# Patient Record
Sex: MALE | Race: BLACK OR AFRICAN AMERICAN | Employment: UNEMPLOYED | URBAN - METROPOLITAN AREA
[De-identification: names, ages, dates, MRNs, and addresses within clinical notes are randomized per-mention and may not be internally consistent; named-entity substitution may affect disease eponyms.]

---

## 2018-02-16 ENCOUNTER — HOSPITAL ENCOUNTER (EMERGENCY)
Age: 48
Discharge: HOME | End: 2018-02-17
Attending: EMERGENCY MEDICINE
Payer: SELF-PAY

## 2018-02-16 DIAGNOSIS — I10 ESSENTIAL HYPERTENSION: ICD-10-CM

## 2018-02-16 DIAGNOSIS — R07.89 CHEST WALL PAIN: ICD-10-CM

## 2018-02-16 DIAGNOSIS — R07.89 ATYPICAL CHEST PAIN: Primary | ICD-10-CM

## 2018-02-16 PROCEDURE — 96374 THER/PROPH/DIAG INJ IV PUSH: CPT

## 2018-02-16 PROCEDURE — 80053 COMPREHEN METABOLIC PANEL: CPT | Performed by: EMERGENCY MEDICINE

## 2018-02-16 PROCEDURE — 82550 ASSAY OF CK (CPK): CPT | Performed by: EMERGENCY MEDICINE

## 2018-02-16 PROCEDURE — 93005 ELECTROCARDIOGRAM TRACING: CPT

## 2018-02-16 PROCEDURE — 84484 ASSAY OF TROPONIN QUANT: CPT | Performed by: EMERGENCY MEDICINE

## 2018-02-16 PROCEDURE — 99284 EMERGENCY DEPT VISIT MOD MDM: CPT

## 2018-02-16 PROCEDURE — 36415 COLL VENOUS BLD VENIPUNCTURE: CPT | Performed by: EMERGENCY MEDICINE

## 2018-02-16 PROCEDURE — 85025 COMPLETE CBC W/AUTO DIFF WBC: CPT | Performed by: EMERGENCY MEDICINE

## 2018-02-16 PROCEDURE — 74011250636 HC RX REV CODE- 250/636: Performed by: EMERGENCY MEDICINE

## 2018-02-16 PROCEDURE — 94762 N-INVAS EAR/PLS OXIMTRY CONT: CPT

## 2018-02-16 RX ORDER — GUAIFENESIN 100 MG/5ML
324 LIQUID (ML) ORAL
Status: DISCONTINUED | OUTPATIENT
Start: 2018-02-16 | End: 2018-02-17

## 2018-02-16 RX ORDER — MORPHINE SULFATE 2 MG/ML
2 INJECTION, SOLUTION INTRAMUSCULAR; INTRAVENOUS
Status: DISCONTINUED | OUTPATIENT
Start: 2018-02-16 | End: 2018-02-17

## 2018-02-16 NOTE — Clinical Note
[facility] SYSTEMS Departments For adult and child immunizations, family planning, TB screening, STD testing and women's health services. [facility] CENTER: [place] [phone] [employer] [phone] [facility]   [phone] [facility] Ci ty   [phone] Martita Louis   [phone] [facility] HOSPITAL: [place] [phone] [place] [phone] Bessy Canseco [phone] [address]  For primary care services, woman and child wellness, and some clinic s providing specialty care. [facility] -- [facility] [facility]. [address] [place] [address] [phone] [facility] 200 [facility] [phone] 132[address] [phone] [facility] 5850 [facility]  [phone] 7705 [facility] [phone] [facility] 81 [facility] [phone] [facility] 1051 [facility], 9 [facility] Crossover Clinic: [facility] [address], ext [phone] [facility], [address] [phone] [place] [address] [phone] [address] 5850 [facility]  [phone] [employer]  [address], [phone] [facility]8 [employer] ([email or web address]) [phone] Sexual Health/Woman Wellness Clinics For STD/HIV testing and treatment, pregnancy testing and services, men's health, birth control services and hepatitis/HPV vaccine services. [facility] 40 [address] [phone] [facility] Derrick Melgoza [phone] [phone] [address], 5th floor [phone] [facility] Women [address] Ferrari [phone] [facility] [employer] [facility] [place] [employer] [address] [place] [employer] [phone] Specialty Service Clinics 112 [facility] [phone] [facility]   [phone] Su Steward   [phone] Women, Infant and Children's Services: [address] [phone] [address] [phone] [address] [place] [address] [facility] Psychiatry     [phone] [facility] r Mental Health Crisis   [phone] 560 [facility] [phone] [facility] [phone] Lucita Gallardo M.D. Tod Arnold M.D. Patti Lee M.D. Vickie Song M.D. MD Nelson Saab, FNP JLUIS Guzman, FNP JLUIS Saab MD Chere Sawyers, NP Ozzie Goff, [address] [phone] MD Jhony Paz MD, MD [facility] [phone] Raynelle Gowers, MD Hermann Master, MD Martell Brady MD Oley Dixon, MD        [phone] Neftali Morris MD               [phone] Cristhian Rios MD      [phone] [facility] [phone] MD Edgar Riggs Res, MD [place], MD 
 [facility] [phone]

## 2018-02-17 ENCOUNTER — APPOINTMENT (OUTPATIENT)
Dept: GENERAL RADIOLOGY | Age: 48
End: 2018-02-17
Attending: EMERGENCY MEDICINE
Payer: SELF-PAY

## 2018-02-17 VITALS
SYSTOLIC BLOOD PRESSURE: 156 MMHG | OXYGEN SATURATION: 100 % | DIASTOLIC BLOOD PRESSURE: 101 MMHG | TEMPERATURE: 97.6 F | RESPIRATION RATE: 13 BRPM | BODY MASS INDEX: 40.43 KG/M2 | HEIGHT: 74 IN | WEIGHT: 315 LBS | HEART RATE: 72 BPM

## 2018-02-17 LAB
ALBUMIN SERPL-MCNC: 3.7 G/DL (ref 3.5–5)
ALBUMIN/GLOB SERPL: 0.9 {RATIO} (ref 1.1–2.2)
ALP SERPL-CCNC: 89 U/L (ref 45–117)
ALT SERPL-CCNC: 24 U/L (ref 12–78)
ANION GAP SERPL CALC-SCNC: 9 MMOL/L (ref 5–15)
AST SERPL-CCNC: 15 U/L (ref 15–37)
BASOPHILS # BLD: 0 K/UL (ref 0–0.1)
BASOPHILS NFR BLD: 0 % (ref 0–1)
BILIRUB SERPL-MCNC: 0.2 MG/DL (ref 0.2–1)
BUN SERPL-MCNC: 14 MG/DL (ref 6–20)
BUN/CREAT SERPL: 10 (ref 12–20)
CALCIUM SERPL-MCNC: 9.1 MG/DL (ref 8.5–10.1)
CHLORIDE SERPL-SCNC: 103 MMOL/L (ref 97–108)
CK MB CFR SERPL CALC: NORMAL % (ref 0–2.5)
CK MB SERPL-MCNC: <1 NG/ML (ref 5–25)
CK SERPL-CCNC: 72 U/L (ref 39–308)
CO2 SERPL-SCNC: 28 MMOL/L (ref 21–32)
CREAT SERPL-MCNC: 1.35 MG/DL (ref 0.7–1.3)
DIFFERENTIAL METHOD BLD: ABNORMAL
EOSINOPHIL # BLD: 0.4 K/UL (ref 0–0.4)
EOSINOPHIL NFR BLD: 4 % (ref 0–7)
ERYTHROCYTE [DISTWIDTH] IN BLOOD BY AUTOMATED COUNT: 14.2 % (ref 11.5–14.5)
GLOBULIN SER CALC-MCNC: 4.1 G/DL (ref 2–4)
GLUCOSE SERPL-MCNC: 100 MG/DL (ref 65–100)
HCT VFR BLD AUTO: 44.5 % (ref 36.6–50.3)
HGB BLD-MCNC: 15.4 G/DL (ref 12.1–17)
IMM GRANULOCYTES # BLD: 0 K/UL (ref 0–0.04)
IMM GRANULOCYTES NFR BLD AUTO: 0 % (ref 0–0.5)
LYMPHOCYTES # BLD: 4.9 K/UL (ref 0.8–3.5)
LYMPHOCYTES NFR BLD: 51 % (ref 12–49)
MCH RBC QN AUTO: 29.4 PG (ref 26–34)
MCHC RBC AUTO-ENTMCNC: 34.6 G/DL (ref 30–36.5)
MCV RBC AUTO: 85.1 FL (ref 80–99)
MONOCYTES # BLD: 0.5 K/UL (ref 0–1)
MONOCYTES NFR BLD: 6 % (ref 5–13)
NEUTS SEG # BLD: 3.7 K/UL (ref 1.8–8)
NEUTS SEG NFR BLD: 39 % (ref 32–75)
NRBC # BLD: 0 K/UL (ref 0–0.01)
NRBC BLD-RTO: 0 PER 100 WBC
PLATELET # BLD AUTO: 256 K/UL (ref 150–400)
PMV BLD AUTO: 9.3 FL (ref 8.9–12.9)
POTASSIUM SERPL-SCNC: 3.6 MMOL/L (ref 3.5–5.1)
PROT SERPL-MCNC: 7.8 G/DL (ref 6.4–8.2)
RBC # BLD AUTO: 5.23 M/UL (ref 4.1–5.7)
SODIUM SERPL-SCNC: 140 MMOL/L (ref 136–145)
TROPONIN I SERPL-MCNC: <0.04 NG/ML
WBC # BLD AUTO: 9.5 K/UL (ref 4.1–11.1)

## 2018-02-17 PROCEDURE — 74011250637 HC RX REV CODE- 250/637: Performed by: EMERGENCY MEDICINE

## 2018-02-17 PROCEDURE — 74011000250 HC RX REV CODE- 250: Performed by: EMERGENCY MEDICINE

## 2018-02-17 PROCEDURE — 71045 X-RAY EXAM CHEST 1 VIEW: CPT

## 2018-02-17 PROCEDURE — 74011250636 HC RX REV CODE- 250/636: Performed by: EMERGENCY MEDICINE

## 2018-02-17 RX ORDER — HYDROCHLOROTHIAZIDE 12.5 MG/1
12.5 TABLET ORAL DAILY
COMMUNITY
End: 2020-11-13

## 2018-02-17 RX ORDER — NAPROXEN 500 MG/1
500 TABLET ORAL
Qty: 20 TAB | Refills: 0 | Status: SHIPPED | OUTPATIENT
Start: 2018-02-17 | End: 2021-02-15 | Stop reason: ALTCHOICE

## 2018-02-17 RX ORDER — MAGNESIUM CITRATE
296 SOLUTION, ORAL ORAL
COMMUNITY
End: 2020-11-12

## 2018-02-17 RX ORDER — HYDROGEN PEROXIDE 3 %
20 SOLUTION, NON-ORAL MISCELLANEOUS DAILY
Qty: 30 CAP | Refills: 0 | Status: SHIPPED | OUTPATIENT
Start: 2018-02-17 | End: 2018-03-19

## 2018-02-17 RX ORDER — KETOROLAC TROMETHAMINE 30 MG/ML
30 INJECTION, SOLUTION INTRAMUSCULAR; INTRAVENOUS
Status: COMPLETED | OUTPATIENT
Start: 2018-02-17 | End: 2018-02-17

## 2018-02-17 RX ADMIN — PHENOBARBITAL ELIXIR 50 ML: 16.2; .1037; .0065; .0194 ELIXIR ORAL at 00:05

## 2018-02-17 RX ADMIN — KETOROLAC TROMETHAMINE 30 MG: 30 INJECTION, SOLUTION INTRAMUSCULAR; INTRAVENOUS at 01:53

## 2018-02-17 NOTE — ED PROVIDER NOTES
EMERGENCY DEPARTMENT HISTORY AND PHYSICAL EXAM      Date: 2/16/2018  Patient Name: Rosario Mckeon    History of Presenting Illness     Chief Complaint   Patient presents with    Chest Pain       History Provided By: Patient    HPI: Rosario Mckeon, 52 y.o. male with PMHx significant for diabetes, presents ambulatory to the ED with cc of intermittent episodes of right sided chest pain radiating down his right arm since this morning. The pain has been constant for the past hour prior to arrival, and he describes it as 9/10 throbbing currently. He notes the pain is exacerbated by movement and deep inspiration. He does not have a cardiologist. He denies any nausea, vomiting, or diarrhea. PCP: Morenita Cavazos MD    There are no other complaints, changes, or physical findings at this time. 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 No pertinent surgical history. Family History:  History reviewed. No pertinent family history. Social History:  Social History   Substance Use Topics    Smoking status: Current Every Day Smoker     Packs/day: 0.25    Smokeless tobacco: Never Used    Alcohol use No       Allergies: Allergies   Allergen Reactions    Pcn [Penicillins] Swelling         Review of Systems   Review of Systems   Constitutional: Negative for chills and fever. HENT: Negative for congestion, rhinorrhea, sneezing and sore throat. Eyes: Negative for redness and visual disturbance. Respiratory: Negative for shortness of breath. Cardiovascular: Positive for chest pain. Negative for leg swelling. Gastrointestinal: Negative for abdominal pain, nausea and vomiting. Genitourinary: Negative for difficulty urinating and frequency. Musculoskeletal: Negative for back pain, myalgias and neck stiffness.    Skin: Negative for rash. Neurological: Negative for dizziness, syncope, weakness and headaches. Hematological: Negative for adenopathy. All other systems reviewed and are negative. Physical Exam   Physical Exam   Constitutional: He is oriented to person, place, and time. He appears well-developed and well-nourished. Overweight. Appears uncomfortable. HENT:   Head: Normocephalic and atraumatic. Mouth/Throat: Oropharynx is clear and moist and mucous membranes are normal.   Eyes: EOM are normal.   Neck: Normal range of motion and full passive range of motion without pain. Neck supple. Cardiovascular: Normal rate, regular rhythm, normal heart sounds, intact distal pulses and normal pulses. No murmur heard. Pulmonary/Chest: Effort normal and breath sounds normal. No respiratory distress. He exhibits tenderness (reproducible, right sided). Abdominal: Soft. Normal appearance and bowel sounds are normal. There is no tenderness. There is no rebound and no guarding. Neurological: He is alert and oriented to person, place, and time. He has normal strength. Skin: Skin is warm, dry and intact. No rash noted. No erythema. Psychiatric: He has a normal mood and affect. His speech is normal and behavior is normal. Judgment and thought content normal.   Nursing note and vitals reviewed. Diagnostic Study Results     Labs -     Recent Results (from the past 12 hour(s))   TROPONIN I    Collection Time: 02/16/18 11:54 PM   Result Value Ref Range    Troponin-I, Qt. <0.04 <0.05 ng/mL   CBC WITH AUTOMATED DIFF    Collection Time: 02/16/18 11:54 PM   Result Value Ref Range    WBC 9.5 4.1 - 11.1 K/uL    RBC 5.23 4. 10 - 5.70 M/uL    HGB 15.4 12.1 - 17.0 g/dL    HCT 44.5 36.6 - 50.3 %    MCV 85.1 80.0 - 99.0 FL    MCH 29.4 26.0 - 34.0 PG    MCHC 34.6 30.0 - 36.5 g/dL    RDW 14.2 11.5 - 14.5 %    PLATELET 628 249 - 130 K/uL    MPV 9.3 8.9 - 12.9 FL    NRBC 0.0 0  WBC    ABSOLUTE NRBC 0.00 0.00 - 0.01 K/uL NEUTROPHILS 39 32 - 75 %    LYMPHOCYTES 51 (H) 12 - 49 %    MONOCYTES 6 5 - 13 %    EOSINOPHILS 4 0 - 7 %    BASOPHILS 0 0 - 1 %    IMMATURE GRANULOCYTES 0 0.0 - 0.5 %    ABS. NEUTROPHILS 3.7 1.8 - 8.0 K/UL    ABS. LYMPHOCYTES 4.9 (H) 0.8 - 3.5 K/UL    ABS. MONOCYTES 0.5 0.0 - 1.0 K/UL    ABS. EOSINOPHILS 0.4 0.0 - 0.4 K/UL    ABS. BASOPHILS 0.0 0.0 - 0.1 K/UL    ABS. IMM. GRANS. 0.0 0.00 - 0.04 K/UL    DF AUTOMATED     CK W/ CKMB & INDEX    Collection Time: 02/16/18 11:54 PM   Result Value Ref Range    CK 72 39 - 308 U/L    CK - MB <1.0 <3.6 NG/ML    CK-MB Index Cannot be calculated 0.0 - 2.5     METABOLIC PANEL, COMPREHENSIVE    Collection Time: 02/16/18 11:54 PM   Result Value Ref Range    Sodium 140 136 - 145 mmol/L    Potassium 3.6 3.5 - 5.1 mmol/L    Chloride 103 97 - 108 mmol/L    CO2 28 21 - 32 mmol/L    Anion gap 9 5 - 15 mmol/L    Glucose 100 65 - 100 mg/dL    BUN 14 6 - 20 MG/DL    Creatinine 1.35 (H) 0.70 - 1.30 MG/DL    BUN/Creatinine ratio 10 (L) 12 - 20      GFR est AA >60 >60 ml/min/1.73m2    GFR est non-AA 57 (L) >60 ml/min/1.73m2    Calcium 9.1 8.5 - 10.1 MG/DL    Bilirubin, total 0.2 0.2 - 1.0 MG/DL    ALT (SGPT) 24 12 - 78 U/L    AST (SGOT) 15 15 - 37 U/L    Alk. phosphatase 89 45 - 117 U/L    Protein, total 7.8 6.4 - 8.2 g/dL    Albumin 3.7 3.5 - 5.0 g/dL    Globulin 4.1 (H) 2.0 - 4.0 g/dL    A-G Ratio 0.9 (L) 1.1 - 2.2         Radiologic Studies -   XR CHEST PORT   Final Result        CT Results  (Last 48 hours)    None        CXR Results  (Last 48 hours)               02/17/18 0031  XR CHEST PORT Final result    Impression:  IMPRESSION: No acute process. Narrative:  EXAM:  CR chest portable       INDICATION:  Intermittent right-sided chest pain since this morning. COMPARISON: None. TECHNIQUE: Portable AP upright chest view at 0025 hours       FINDINGS: Cardiac monitoring wires overlie the thorax. The cardiomediastinal   contours are within normal limits.  The lungs and pleural spaces are clear. There   is no pneumothorax. The bones and upper abdomen are normal for age. Medical Decision Making   I am the first provider for this patient. I reviewed the vital signs, available nursing notes, past medical history, past surgical history, family history and social history. Vital Signs-Reviewed the patient's vital signs. Patient Vitals for the past 12 hrs:   Temp Pulse Resp BP SpO2   02/17/18 0130 - 72 13 (!) 156/101 100 %   02/17/18 0100 - 77 21 (!) 172/109 99 %   02/17/18 0030 - 77 19 (!) 167/114 100 %   02/17/18 0023 - - - - 100 %   02/16/18 2345 97.6 °F (36.4 °C) 85 14 (!) 131/102 100 %       Pulse Oximetry Analysis - 100% on room air    Cardiac Monitor:   Rate: 80 bpm  Rhythm: Normal Sinus Rhythm      EKG interpretation: (Preliminary) 11:48 PM  Rhythm: normal sinus rhythm; and regular . Rate (approx.): 80; Axis: normal; NV interval: normal; QRS interval: normal ; ST/T wave: normal; Other findings: normal.  Written by Jb Burgess. Chi Ruggiero, ED Scribe, as dictated by Chris Massey MD.    Records Reviewed: Nursing Notes and Old Medical Records    Provider Notes (Medical Decision Making):   DDx: musculoskeletal chest pain, GERD, ACS, aortic aneurysm     ED Course:   Initial assessment performed. The patients presenting problems have been discussed, and they are in agreement with the care plan formulated and outlined with them. I have encouraged them to ask questions as they arise throughout their visit. Progress Note  1:38 AM    Chris Massey MD has re-evaluated pt, and pt states his pain is improved after GI cocktail. Disposition:  DISCHARGE NOTE  2:18 AM  The patient has been re-evaluated and is ready for discharge. Reviewed available results with patient. Counseled pt on diagnosis and care plan. Pt has expressed understanding, and all questions have been answered.  Pt agrees with plan and agrees to follow up as recommended, or return to the ED if their symptoms worsen. Discharge instructions have been provided and explained to the pt, along with reasons to return to the ED. PLAN:  1. Current Discharge Medication List      START taking these medications    Details   esomeprazole (NEXIUM) 20 mg capsule Take 1 Cap by mouth daily for 30 days. Qty: 30 Cap, Refills: 0      naproxen (NAPROSYN) 500 mg tablet Take 1 Tab by mouth every twelve (12) hours as needed for Pain. Qty: 20 Tab, Refills: 0           2. Follow-up Information     Follow up With Details Comments Contact Info    One of the PCP's from the clinic list or back home Schedule an appointment as soon as possible for a visit      [facility] EMERGENCY DEPT  As needed, If symptoms worsen [address]  [phone]        Return to ED if worse     Diagnosis     Clinical Impression:   1. Atypical chest pain    2. Chest wall pain    3. Essential hypertension        Attestations: This note is prepared by Hansel Ford, acting as Scribe for Miguelina Liang MD.    Miguelina Liang MD: The scribe's documentation has been prepared under my direction and personally reviewed by me in its entirety. I confirm that the note above accurately reflects all work, treatment, procedures, and medical decision making performed by me.

## 2018-02-17 NOTE — ED NOTES
This RN assumes role as preceptor to Chai Pan RN. This RN reviews all assessments, charting, medication administration, and skills performed by the preceptee.

## 2018-02-17 NOTE — DISCHARGE INSTRUCTIONS
Chest Pain: Care Instructions  Your Care Instructions    There are many things that can cause chest pain. Some are not serious and will get better on their own in a few days. But some kinds of chest pain need more testing and treatment. Your doctor may have recommended a follow-up visit in the next 8 to 12 hours. If you are not getting better, you may need more tests or treatment. Even though your doctor has released you, you still need to watch for any problems. The doctor carefully checked you, but sometimes problems can develop later. If you have new symptoms or if your symptoms do not get better, get medical care right away. If you have worse or different chest pain or pressure that lasts more than 5 minutes or you passed out (lost consciousness), call 911 or seek other emergency help right away. A medical visit is only one step in your treatment. Even if you feel better, you still need to do what your doctor recommends, such as going to all suggested follow-up appointments and taking medicines exactly as directed. This will help you recover and help prevent future problems. How can you care for yourself at home? · Rest until you feel better. · Take your medicine exactly as prescribed. Call your doctor if you think you are having a problem with your medicine. · Do not drive after taking a prescription pain medicine. When should you call for help? Call 911 if:  ? · You passed out (lost consciousness). ? · You have severe difficulty breathing. ? · You have symptoms of a heart attack. These may include:  ¨ Chest pain or pressure, or a strange feeling in your chest.  ¨ Sweating. ¨ Shortness of breath. ¨ Nausea or vomiting. ¨ Pain, pressure, or a strange feeling in your back, neck, jaw, or upper belly or in one or both shoulders or arms. ¨ Lightheadedness or sudden weakness. ¨ A fast or irregular heartbeat.   After you call 911, the  may tell you to chew 1 adult-strength or 2 to 4 low-dose aspirin. Wait for an ambulance. Do not try to drive yourself. ?Call your doctor today if:  ? · You have any trouble breathing. ? · Your chest pain gets worse. ? · You are dizzy or lightheaded, or you feel like you may faint. ? · You are not getting better as expected. ? · You are having new or different chest pain. Where can you learn more? Go to http://lucia-kortney.info/. Enter A120 in the search box to learn more about \"Chest Pain: Care Instructions. \"  Current as of: March 20, 2017  Content Version: 11.4  © 1051-0127 [employer]. Care instructions adapted under license by [employer] (which disclaims liability or warranty for this information). If you have questions about a medical condition or this instruction, always ask your healthcare professional. [address] any warranty or liability for your use of this information. High Blood Pressure: Care Instructions  Your Care Instructions    If your blood pressure is usually above 140/90, you have high blood pressure, or hypertension. That means the top number is 140 or higher or the bottom number is 90 or higher, or both. Despite what a lot of people think, high blood pressure usually doesn't cause headaches or make you feel dizzy or lightheaded. It usually has no symptoms. But it does increase your risk for heart attack, stroke, and kidney or eye damage. The higher your blood pressure, the more your risk increases. Your doctor will give you a goal for your blood pressure. Your goal will be based on your health and your age. An example of a goal is to keep your blood pressure below 140/90. Lifestyle changes, such as eating healthy and being active, are always important to help lower blood pressure. You might also take medicine to reach your blood pressure goal.  Follow-up care is a key part of your treatment and safety.  Be sure to make and go to all appointments, and call your doctor if you are having problems. It's also a good idea to know your test results and keep a list of the medicines you take. How can you care for yourself at home? Medical treatment  · If you stop taking your medicine, your blood pressure will go back up. You may take one or more types of medicine to lower your blood pressure. Be safe with medicines. Take your medicine exactly as prescribed. Call your doctor if you think you are having a problem with your medicine. · Talk to your doctor before you start taking aspirin every day. Aspirin can help certain people lower their risk of a heart attack or stroke. But taking aspirin isn't right for everyone, because it can cause serious bleeding. · See your doctor regularly. You may need to see the doctor more often at first or until your blood pressure comes down. · If you are taking blood pressure medicine, talk to your doctor before you take decongestants or anti-inflammatory medicine, such as ibuprofen. Some of these medicines can raise blood pressure. · Learn how to check your blood pressure at home. Lifestyle changes  · Stay at a healthy weight. This is especially important if you put on weight around the waist. Losing even 10 pounds can help you lower your blood pressure. · If your doctor recommends it, get more exercise. Walking is a good choice. Bit by bit, increase the amount you walk every day. Try for at least 30 minutes on most days of the week. You also may want to swim, bike, or do other activities. · Avoid or limit alcohol. Talk to your doctor about whether you can drink any alcohol. · Try to limit how much sodium you eat to less than 2,300 milligrams (mg) a day. Your doctor may ask you to try to eat less than 1,500 mg a day. · Eat plenty of fruits (such as bananas and oranges), vegetables, legumes, whole grains, and low-fat dairy products. · Lower the amount of saturated fat in your diet.  Saturated fat is found in animal products such as milk, cheese, and meat. Limiting these foods may help you lose weight and also lower your risk for heart disease. · Do not smoke. Smoking increases your risk for heart attack and stroke. If you need help quitting, talk to your doctor about stop-smoking programs and medicines. These can increase your chances of quitting for good. When should you call for help? Call 911 anytime you think you may need emergency care. This may mean having symptoms that suggest that your blood pressure is causing a serious heart or blood vessel problem. Your blood pressure may be over 180/110. ? For example, call 911 if:  ? · You have symptoms of a heart attack. These may include:  ¨ Chest pain or pressure, or a strange feeling in the chest.  ¨ Sweating. ¨ Shortness of breath. ¨ Nausea or vomiting. ¨ Pain, pressure, or a strange feeling in the back, neck, jaw, or upper belly or in one or both shoulders or arms. ¨ Lightheadedness or sudden weakness. ¨ A fast or irregular heartbeat. ? · You have symptoms of a stroke. These may include:  ¨ Sudden numbness, tingling, weakness, or loss of movement in your face, arm, or leg, especially on only one side of your body. ¨ Sudden vision changes. ¨ Sudden trouble speaking. ¨ Sudden confusion or trouble understanding simple statements. ¨ Sudden problems with walking or balance. ¨ A sudden, severe headache that is different from past headaches. ? · You have severe back or belly pain. ?Do not wait until your blood pressure comes down on its own. Get help right away. ?Call your doctor now or seek immediate care if:  ? · Your blood pressure is much higher than normal (such as 180/110 or higher), but you don't have symptoms. ? · You think high blood pressure is causing symptoms, such as:  ¨ Severe headache. ¨ Blurry vision. ? Watch closely for changes in your health, and be sure to contact your doctor if:  ? · Your blood pressure measures 140/90 or higher at least 2 times.  That means the top number is 140 or higher or the bottom number is 90 or higher, or both. ? · You think you may be having side effects from your blood pressure medicine. ? · Your blood pressure is usually normal, but it goes above normal at least 2 times. Where can you learn more? Go to http://lucia-kortney.info/. Enter D635 in the search box to learn more about \"High Blood Pressure: Care Instructions. \"  Current as of: September 21, 2016  Content Version: 11.4  © 5926-8894 [employer]. Care instructions adapted under license by [employer] (which disclaims liability or warranty for this information). If you have questions about a medical condition or this instruction, always ask your healthcare professional. [address] any warranty or liability for your use of this information. Musculoskeletal Chest Pain: Care Instructions  Your Care Instructions    Chest pain is not always a sign that something is wrong with your heart or that you have another serious problem. The doctor thinks your chest pain is caused by strained muscles or ligaments, inflamed chest cartilage, or another problem in your chest, rather than by your heart. You may need more tests to find the cause of your chest pain. Follow-up care is a key part of your treatment and safety. Be sure to make and go to all appointments, and call your doctor if you are having problems. It's also a good idea to know your test results and keep a list of the medicines you take. How can you care for yourself at home? · Take pain medicines exactly as directed. ¨ If the doctor gave you a prescription medicine for pain, take it as prescribed. ¨ If you are not taking a prescription pain medicine, ask your doctor if you can take an over-the-counter medicine. · Rest and protect the sore area. · Stop, change, or take a break from any activity that may be causing your pain or soreness.   · Put ice or a cold pack on the sore area for 10 to 20 minutes at a time. Try to do this every 1 to 2 hours for the next 3 days (when you are awake) or until the swelling goes down. Put a thin cloth between the ice and your skin. · After 2 or 3 days, apply a heating pad set on low or a warm cloth to the area that hurts. Some doctors suggest that you go back and forth between hot and cold. · Do not wrap or tape your ribs for support. This may cause you to take smaller breaths, which could increase your risk of lung problems. · Mentholated creams such as Bengay or Icy Hot may soothe sore muscles. Follow the instructions on the package. · Follow your doctor's instructions for exercising. · Gentle stretching and massage may help you get better faster. Stretch slowly to the point just before pain begins, and hold the stretch for at least 15 to 30 seconds. Do this 3 or 4 times a day. Stretch just after you have applied heat. · As your pain gets better, slowly return to your normal activities. Any increased pain may be a sign that you need to rest a while longer. When should you call for help? Call 911 anytime you think you may need emergency care. For example, call if:  ? · You have chest pain or pressure. This may occur with:  ¨ Sweating. ¨ Shortness of breath. ¨ Nausea or vomiting. ¨ Pain that spreads from the chest to the neck, jaw, or one or both shoulders or arms. ¨ Dizziness or lightheadedness. ¨ A fast or uneven pulse. After calling 911, chew 1 adult-strength aspirin. Wait for an ambulance. Do not try to drive yourself. ? · You have sudden chest pain and shortness of breath, or you cough up blood. ?Call your doctor now or seek immediate medical care if:  ? · You have any trouble breathing. ? · Your chest pain gets worse. ? · Your chest pain occurs consistently with exercise and is relieved by rest.   ? Watch closely for changes in your health, and be sure to contact your doctor if:  ? · Your chest pain does not get better after 1 week. Where can you learn more? Go to http://lucia-kortney.info/. Enter V293 in the search box to learn more about \"Musculoskeletal Chest Pain: Care Instructions. \"  Current as of: March 20, 2017  Content Version: 11.4  © 7249-7020 [employer]. Care instructions adapted under license by [employer] (which disclaims liability or warranty for this information). If you have questions about a medical condition or this instruction, always ask your healthcare professional. [address] any warranty or liability for your use of this information.

## 2018-02-17 NOTE — ED NOTES
Discharge instructions were given to the patient by Garret Antonio provider. The patient left the Emergency Department ambulatory, alert and oriented and in no acute distress with 2 prescriptions. The patient was encouraged to call or return to the ED for worsening issues or problems and was encouraged to schedule a follow up appointment for continuing care. The patient verbalized understanding of discharge instructions and prescriptions, all questions were answered. The patient has no further concerns at this time.

## 2018-02-17 NOTE — ED NOTES
Pt reporting previous fall at unknown time either two weeks ago or two months ago. Pt reports fracture to right wrist stating \"I broke it in six places\". Pt is also reporting fracture to right 3rd digit. Family member at bedside reports pt had bruise along right shoulder. Pt and family member given water to drink. Pt reporting improvement in pain after GI cocktail. MD made aware.

## 2018-02-17 NOTE — ED NOTES
Pt presents to ED via wheelchair complaining of pain to the right side of the chest that shoots down the right arm where pt also reporting numbness and tingling. Pt reports symptoms started this AM but went away and started again approx one hour PTA. Pt is alert and oriented x 4, skin is warm and dry. Assessment completed and pt updated on plan of care. Emergency Department Nursing Plan of Care       The Nursing Plan of Care is developed from the Nursing assessment and Emergency Department Attending provider initial evaluation. The plan of care may be reviewed in the ED Provider note.     The Plan of Care was developed with the following considerations:   Patient / Family readiness to learn indicated by:verbalized understanding  Persons(s) to be included in education: patient and family  Barriers to Learning/Limitations:No    Signed     Harpreet Aquilino    2/17/2018   12:29 AM

## 2018-02-20 LAB
ATRIAL RATE: 80 BPM
CALCULATED P AXIS, ECG09: 32 DEGREES
CALCULATED R AXIS, ECG10: 4 DEGREES
CALCULATED T AXIS, ECG11: 35 DEGREES
DIAGNOSIS, 93000: NORMAL
P-R INTERVAL, ECG05: 192 MS
Q-T INTERVAL, ECG07: 376 MS
QRS DURATION, ECG06: 80 MS
QTC CALCULATION (BEZET), ECG08: 433 MS
VENTRICULAR RATE, ECG03: 80 BPM

## 2019-03-02 ENCOUNTER — HOSPITAL ENCOUNTER (EMERGENCY)
Age: 49
Discharge: HOME | End: 2019-03-03
Attending: EMERGENCY MEDICINE
Payer: COMMERCIAL

## 2019-03-02 VITALS
HEIGHT: 75 IN | RESPIRATION RATE: 20 BRPM | HEART RATE: 82 BPM | SYSTOLIC BLOOD PRESSURE: 174 MMHG | WEIGHT: 315 LBS | DIASTOLIC BLOOD PRESSURE: 104 MMHG | TEMPERATURE: 97.9 F | BODY MASS INDEX: 39.17 KG/M2 | OXYGEN SATURATION: 99 %

## 2019-03-02 DIAGNOSIS — R19.7 DIARRHEA, UNSPECIFIED TYPE: Primary | ICD-10-CM

## 2019-03-02 DIAGNOSIS — R11.0 NAUSEA WITHOUT VOMITING: ICD-10-CM

## 2019-03-02 DIAGNOSIS — I10 HYPERTENSION, UNSPECIFIED TYPE: ICD-10-CM

## 2019-03-02 PROCEDURE — 99283 EMERGENCY DEPT VISIT LOW MDM: CPT

## 2019-03-03 LAB
ANION GAP BLD CALC-SCNC: 17 MMOL/L (ref 10–20)
BUN BLD-MCNC: 16 MG/DL (ref 9–20)
CA-I BLD-MCNC: 1.11 MMOL/L (ref 1.12–1.32)
CHLORIDE BLD-SCNC: 102 MMOL/L (ref 98–107)
CO2 BLD-SCNC: 26 MMOL/L (ref 21–32)
CREAT BLD-MCNC: 1.1 MG/DL (ref 0.6–1.3)
GLUCOSE BLD-MCNC: 85 MG/DL (ref 65–100)
HCT VFR BLD CALC: 47 % (ref 36.6–50.3)
POTASSIUM BLD-SCNC: 4.2 MMOL/L (ref 3.5–5.1)
SERVICE CMNT-IMP: ABNORMAL
SODIUM BLD-SCNC: 139 MMOL/L (ref 136–145)

## 2019-03-03 PROCEDURE — 74011250637 HC RX REV CODE- 250/637: Performed by: EMERGENCY MEDICINE

## 2019-03-03 PROCEDURE — 80047 BASIC METABLC PNL IONIZED CA: CPT

## 2019-03-03 RX ORDER — HYDROCHLOROTHIAZIDE 25 MG/1
25 TABLET ORAL
Status: COMPLETED | OUTPATIENT
Start: 2019-03-03 | End: 2019-03-03

## 2019-03-03 RX ORDER — ONDANSETRON 4 MG/1
4 TABLET, ORALLY DISINTEGRATING ORAL
Qty: 10 TAB | Refills: 0 | Status: SHIPPED | OUTPATIENT
Start: 2019-03-03 | End: 2020-11-12

## 2019-03-03 RX ORDER — LOPERAMIDE HYDROCHLORIDE 2 MG/1
2 CAPSULE ORAL
Qty: 20 CAP | Refills: 0 | Status: SHIPPED | OUTPATIENT
Start: 2019-03-03 | End: 2020-11-12

## 2019-03-03 RX ADMIN — HYDROCHLOROTHIAZIDE 25 MG: 25 TABLET ORAL at 00:17

## 2019-03-03 NOTE — ED NOTES
Pt resting contently in room, in no acute distress, and updated on plan of care. Call bell within reach.

## 2019-03-03 NOTE — DISCHARGE INSTRUCTIONS

## 2019-03-03 NOTE — ED NOTES
Pt presents to ED ambulatory complaining of nausea and generalized body aches x 5 days. Pt reports diarrhea and limb \"cramping. \" Pt denies vomiting, cough, and states \"I had a fever but its gone now. \" Pt is alert and oriented x 4, RR even and unlabored, skin is warm and dry. Assessment completed and pt updated on plan of care. Emergency Department Nursing Plan of Care       The Nursing Plan of Care is developed from the Nursing assessment and Emergency Department Attending provider initial evaluation. The plan of care may be reviewed in the ED Provider note.     The Plan of Care was developed with the following considerations:   Patient / Family readiness to learn indicated by:verbalized understanding  Persons(s) to be included in education: patient  Barriers to Learning/Limitations:No    Signed     Chinyere Vieira    3/2/2019   10:49 PM

## 2019-03-03 NOTE — ED NOTES
Discharge instructions were given to the patient by Oanh Dave RN. The patient left the Emergency Department ambulatory, alert and oriented and in no acute distress with 2 prescriptions. The patient was encouraged to call or return to the ED for worsening issues or problems and was encouraged to schedule a follow up appointment for continuing care. The patient verbalized understanding of discharge instructions and prescriptions, all questions were answered. The patient has no further concerns at this time.

## 2019-03-03 NOTE — ED PROVIDER NOTES
EMERGENCY DEPARTMENT HISTORY AND PHYSICAL EXAM      Date: 3/2/2019  Patient Name: Bard Braga.    History of Presenting Illness     Chief Complaint   Patient presents with    Generalized Body Aches    Nausea       History Provided By: Patient    HPI: Neri Antonio Sr., 50 y.o. male with PMHx significant for HTN, GI, presents ambulatory to the ED with cc of moderate, progressive generalized body aches with associated diaphoresis, nausea, and diarrhea for 5 days. Pt reports he recently travelled to [place] and upon his return, symptoms gradually presented. He denies taking any medication to relieve current symptoms. He mentions noncompliance of medication regimen to manage HTN, lastly taking blood pressure medication ~ 2 days ago. Pt denies any modifying and exacerbating factors. He specifically denies any fevers, chills, vomiting, chest pain, shortness of breath, headache, rash, or weight loss. There are no other complaints, changes, or physical findings at this time. PCP: Dirk, MD Morenita    No current facility-administered medications on file prior to encounter. Current Outpatient Medications on File Prior to Encounter   Medication Sig Dispense Refill    hydroCHLOROthiazide (HYDRODIURIL) 12.5 mg tablet Take 12.5 mg by mouth daily.  magnesium citrate solution Take 296 mL by mouth now. Indications: pt takes every 2 weeks      naproxen (NAPROSYN) 500 mg tablet Take 1 Tab by mouth every twelve (12) hours as needed for Pain. 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 No pertinent surgical history. Family History:  History reviewed. No pertinent family history.     Social History:  Social History     Tobacco Use    Smoking status: Current Every Day Smoker     Packs/day: 0.25    Smokeless tobacco: Never Used   Substance Use Topics    Alcohol use: No    Drug use: No       Allergies: Allergies   Allergen Reactions    Pcn [Penicillins] Swelling         Review of Systems   Review of Systems   Constitutional: Positive for diaphoresis. Negative for chills and fever. HENT: Negative for congestion, rhinorrhea, sneezing and sore throat. Eyes: Negative for redness and visual disturbance. Respiratory: Negative for shortness of breath. Cardiovascular: Negative for chest pain and leg swelling. Gastrointestinal: Positive for diarrhea and nausea. Negative for abdominal pain and vomiting. Genitourinary: Negative for difficulty urinating and frequency. Musculoskeletal: Positive for myalgias. Negative for back pain and neck stiffness. Skin: Negative for rash. Neurological: Negative for dizziness, syncope, weakness and headaches. Hematological: Negative for adenopathy. All other systems reviewed and are negative. Physical Exam   Physical Exam     GENERAL: alert and oriented, no acute distress  EYES: PEERL, No injection, discharge or icterus. HENT: Mucous membranes pink and moist.  NECK: Supple  LUNGS: Airway patent. Non-labored respirations. Breath sounds clear with good air entry bilaterally. HEART: Regular rate and rhythm. No peripheral edema  ABDOMEN: Non-distended and non-tender, without guarding or rebound.   SKIN:  warm, dry  MSK/ EXTREMITIES: Without swelling, tenderness or deformity, symmetric with normal ROM  NEUROLOGICAL: Alert, oriented    Diagnostic Study Results     Labs -     Recent Results (from the past 12 hour(s))   POC CHEM8    Collection Time: 03/03/19 12:05 AM   Result Value Ref Range    Calcium, ionized (POC) 1.11 (L) 1.12 - 1.32 mmol/L    Sodium (POC) 139 136 - 145 mmol/L    Potassium (POC) 4.2 3.5 - 5.1 mmol/L    Chloride (POC) 102 98 - 107 mmol/L    CO2 (POC) 26 21 - 32 mmol/L    Anion gap (POC) 17 10 - 20 mmol/L    Glucose (POC) 85 65 - 100 mg/dL    BUN (POC) 16 9 - 20 mg/dL    Creatinine (POC) 1.1 0.6 - 1.3 mg/dL    GFRAA, POC >60 >60 ml/min/1.73m2    GFRNA, POC >60 >60 ml/min/1.73m2    Hematocrit (POC) 47 36.6 - 50.3 %    Comment Notified RN or MD immediately by          Radiologic Studies -   None    Medical Decision Making   I am the first provider for this patient. I reviewed the vital signs, available nursing notes, past medical history, past surgical history, family history and social history. Vital Signs-Reviewed the patient's vital signs. Patient Vitals for the past 12 hrs:   Temp Pulse Resp BP SpO2   03/02/19 2238 97.9 °F (36.6 °C) 82 20 (!) 174/104 99 %       Pulse Oximetry Analysis - 99% on RA    Records Reviewed: Nursing Notes, Old Medical Records, Previous Radiology Studies and Previous Laboratory Studies    Provider Notes (Medical Decision Making):   Patient presents to the emergency room for abd pain, vomiting and diarrhea. On presentation, the patient is well appearing, in no acute distress with vital signs notable for htn. Differential diagnosis considered includes gastroenteritis, gastritis, enteritis, inflammatory bowel disease, bowel obstruction, pancreatitis, cholecystitis, appendicitis, c.diff colitis, travelers diarrhea, diverticulitis, cyclic vomiting syndrome, or a food-borne illness. No sx suggestive of hypertensive emergency. Abdominal examination was soft and benign so no concern for any acute surgical issues at presentation. Clinical history, examination, and work-up are consistent with gastroenteritis and is expected to be self-limiting. Patient was provided  anti-emetics with overall improvement in symptoms. The patient was able to tolerate oral intake prior to discharge. Stools were reportedly non-bloody and no antibiotics were felt to be warranted at this time. Patient is encouraged to maintain hydration. A prescription for anti-emetics was provided. CT scan was not felt to be warranted at this time. Patient should return for severe pain, intractable vomiting, fevers, bloody stools, or dehydration. The patient understood the diagnosis and treatment and had no further questions. Patient should call a primary care physician for follow-up, and was otherwise deemed stable for discharge to home. ED Course:   Initial assessment performed. The patients presenting problems have been discussed, and they are in agreement with the care plan formulated and outlined with them. I have encouraged them to ask questions as they arise throughout their visit. HYPERTENSION COUNSELING:   Patient denies any current chest pain, headache, shortness of breath, lightheadedness, dizziness, or changes in vision. Patient is made aware of their elevated blood pressure and is instructed to follow up this week with their Primary Care for a recheck. Patient is counseled regarding consequences of chronic, uncontrolled hypertension including kidney disease, heart disease, stroke or even death. Patient verbally states their understanding. Tobacco Cessation Counseling   I spent 3 minutes discussing the medical risks of prolonged smoking habits and advised the patient of the benefits of the cessation of smoking, providing specific suggestions on how to quit. Tyrese Grigsby MD   .    Critical Care Time:   0 minutes    Disposition:  Discharge Note:  12:25 AM  The pt is ready for discharge. The pt's signs, symptoms, diagnosis, and discharge instructions have been discussed and pt has conveyed their understanding. The pt is to follow up as recommended or return to ER should their symptoms worsen. Plan has been discussed and pt is in agreement. PLAN:  1. Current Discharge Medication List      START taking these medications    Details   loperamide (IMODIUM) 2 mg capsule Take 1 Cap by mouth four (4) times daily as needed for Diarrhea.   Qty: 20 Cap, Refills: 0      ondansetron (ZOFRAN ODT) 4 mg disintegrating tablet Take 1 Tab by mouth every eight (8) hours as needed for Nausea. Qty: 10 Tab, Refills: 0           2. Follow-up Information     Follow up With Specialties Details Why [address] [place] [address] Internal Medicine Schedule an appointment as soon as possible for a visit in 2 days  [phone]  [phone]        Return to ED if worse     Diagnosis     Clinical Impression:   1. Diarrhea, unspecified type    2. Nausea without vomiting    3. Hypertension, unspecified type        Attestations: This note is prepared by Esme Silva, acting as Scribe for Julianna Taylor MD.    Julianna Taylor MD: The scribe's documentation has been prepared under my direction and personally reviewed by me in its entirety.  I confirm that the note above accurately reflects all work, treatment, procedures, and medical decision making performed by me

## 2019-12-31 ENCOUNTER — HOSPITAL ENCOUNTER (OUTPATIENT)
Dept: ULTRASOUND IMAGING | Age: 49
Discharge: HOME | End: 2019-12-31
Attending: NURSE PRACTITIONER
Payer: COMMERCIAL

## 2019-12-31 DIAGNOSIS — K46.9 HERNIA, ABDOMINAL: ICD-10-CM

## 2019-12-31 PROCEDURE — 76705 ECHO EXAM OF ABDOMEN: CPT

## 2020-11-12 ENCOUNTER — HOSPITAL ENCOUNTER (OUTPATIENT)
Age: 50
Setting detail: OBSERVATION
Discharge: HOME | End: 2020-11-13
Attending: EMERGENCY MEDICINE | Admitting: INTERNAL MEDICINE

## 2020-11-12 ENCOUNTER — APPOINTMENT (OUTPATIENT)
Dept: GENERAL RADIOLOGY | Age: 50
End: 2020-11-12
Attending: EMERGENCY MEDICINE

## 2020-11-12 DIAGNOSIS — M54.2 NECK PAIN: ICD-10-CM

## 2020-11-12 DIAGNOSIS — R06.09 DYSPNEA ON EXERTION: Primary | ICD-10-CM

## 2020-11-12 PROBLEM — R06.00 DYSPNEA: Status: ACTIVE | Noted: 2020-11-12

## 2020-11-12 PROBLEM — Z72.0 TOBACCO ABUSE: Status: ACTIVE | Noted: 2020-11-12

## 2020-11-12 PROBLEM — I51.7 CARDIOMEGALY: Status: ACTIVE | Noted: 2020-11-12

## 2020-11-12 PROBLEM — I10 HYPERTENSION: Status: ACTIVE | Noted: 2020-11-12

## 2020-11-12 PROBLEM — N17.9 AKI (ACUTE KIDNEY INJURY) (HCC): Status: ACTIVE | Noted: 2020-11-12

## 2020-11-12 PROBLEM — J45.909 ASTHMA: Status: ACTIVE | Noted: 2020-11-12

## 2020-11-12 LAB
ALBUMIN SERPL-MCNC: 3.8 G/DL (ref 3.5–5)
ALBUMIN/GLOB SERPL: 0.9 {RATIO} (ref 1.1–2.2)
ALP SERPL-CCNC: 75 U/L (ref 45–117)
ALT SERPL-CCNC: 20 U/L (ref 12–78)
ANION GAP SERPL CALC-SCNC: 7 MMOL/L (ref 5–15)
APPEARANCE UR: CLEAR
AST SERPL W P-5'-P-CCNC: 16 U/L (ref 15–37)
ATRIAL RATE: 85 BPM
BACTERIA URNS QL MICRO: ABNORMAL /HPF
BASOPHILS # BLD: 0.1 K/UL (ref 0–0.2)
BASOPHILS NFR BLD: 1 % (ref 0–2.5)
BILIRUB SERPL-MCNC: 0.8 MG/DL (ref 0.2–1)
BILIRUB UR QL: NEGATIVE
BNP SERPL-MCNC: 78 PG/ML
BUN SERPL-MCNC: 11 MG/DL (ref 6–20)
BUN/CREAT SERPL: 8 (ref 12–20)
CA-I BLD-MCNC: 9.1 MG/DL (ref 8.5–10.1)
CALCULATED P AXIS, ECG09: 35 DEGREES
CALCULATED R AXIS, ECG10: 30 DEGREES
CALCULATED T AXIS, ECG11: 55 DEGREES
CHLORIDE SERPL-SCNC: 100 MMOL/L (ref 97–108)
CO2 SERPL-SCNC: 28 MMOL/L (ref 21–32)
COLOR UR: ABNORMAL
CREAT SERPL-MCNC: 1.32 MG/DL (ref 0.7–1.3)
DEPRECATED S PYO AG THROAT QL EIA: NEGATIVE
DIAGNOSIS, 93000: NORMAL
EOSINOPHIL # BLD: 0.2 K/UL (ref 0–0.7)
EOSINOPHIL NFR BLD: 2 % (ref 0.9–2.9)
ERYTHROCYTE [DISTWIDTH] IN BLOOD BY AUTOMATED COUNT: 14.5 % (ref 11.5–14.5)
FLUAV AG NPH QL IA: NEGATIVE
FLUBV AG NOSE QL IA: NEGATIVE
GLOBULIN SER CALC-MCNC: 4.2 G/DL (ref 2–4)
GLUCOSE SERPL-MCNC: 92 MG/DL (ref 65–100)
GLUCOSE UR STRIP.AUTO-MCNC: NEGATIVE MG/DL
HCT VFR BLD AUTO: 46.6 % (ref 41–53)
HGB BLD-MCNC: 15.8 G/DL (ref 13.5–17.5)
HGB UR QL STRIP: ABNORMAL
INR PPP: 1 (ref 0.9–1.1)
KETONES UR QL STRIP.AUTO: NEGATIVE MG/DL
LEUKOCYTE ESTERASE UR QL STRIP.AUTO: NEGATIVE
LYMPHOCYTES # BLD: 2 K/UL (ref 1–4.8)
LYMPHOCYTES NFR BLD: 21 % (ref 20.5–51.1)
MAGNESIUM SERPL-MCNC: 1.8 MG/DL (ref 1.6–2.4)
MCH RBC QN AUTO: 29.9 PG (ref 31–34)
MCHC RBC AUTO-ENTMCNC: 34 G/DL (ref 31–36)
MCV RBC AUTO: 87.9 FL (ref 80–100)
MONOCYTES # BLD: 0.6 K/UL (ref 0.2–2.4)
MONOCYTES NFR BLD: 6 % (ref 1.7–9.3)
NEUTS SEG # BLD: 6.5 K/UL (ref 1.8–7.7)
NEUTS SEG NFR BLD: 70 % (ref 42–75)
NITRITE UR QL STRIP.AUTO: NEGATIVE
NRBC # BLD: 0.01 K/UL
NRBC BLD-RTO: 10 PER 100 WBC
P-R INTERVAL, ECG05: 181 MS
PH UR STRIP: 6 [PH] (ref 5–8)
PLATELET # BLD AUTO: 222 K/UL
PMV BLD AUTO: 8 FL (ref 6.5–11.5)
POTASSIUM SERPL-SCNC: 4.1 MMOL/L (ref 3.5–5.1)
PROT SERPL-MCNC: 8 G/DL (ref 6.4–8.2)
PROT UR STRIP-MCNC: NEGATIVE MG/DL
PROTHROMBIN TIME: 9.9 SEC (ref 9–11.1)
Q-T INTERVAL, ECG07: 368 MS
QRS DURATION, ECG06: 83 MS
QTC CALCULATION (BEZET), ECG08: 438 MS
RBC # BLD AUTO: 5.3 M/UL (ref 4.5–5.9)
RBC #/AREA URNS HPF: ABNORMAL /HPF (ref 0–3)
SARS-COV-2, COV2: NORMAL
SODIUM SERPL-SCNC: 135 MMOL/L (ref 136–145)
SP GR UR REFRACTOMETRY: 1.02 (ref 1–1.03)
TROPONIN I SERPL-MCNC: <0.05 NG/ML
TSH SERPL DL<=0.05 MIU/L-ACNC: 1.29 UIU/ML (ref 0.36–3.74)
UROBILINOGEN UR QL STRIP.AUTO: 0.2 EU/DL (ref 0.2–1)
VENTRICULAR RATE, ECG03: 85 BPM
WBC # BLD AUTO: 9.3 K/UL (ref 4.4–11.3)
WBC URNS QL MICRO: ABNORMAL /HPF (ref 0–5)

## 2020-11-12 PROCEDURE — 84443 ASSAY THYROID STIM HORMONE: CPT

## 2020-11-12 PROCEDURE — 83880 ASSAY OF NATRIURETIC PEPTIDE: CPT

## 2020-11-12 PROCEDURE — 96375 TX/PRO/DX INJ NEW DRUG ADDON: CPT

## 2020-11-12 PROCEDURE — 87635 SARS-COV-2 COVID-19 AMP PRB: CPT

## 2020-11-12 PROCEDURE — 99285 EMERGENCY DEPT VISIT HI MDM: CPT

## 2020-11-12 PROCEDURE — 96374 THER/PROPH/DIAG INJ IV PUSH: CPT

## 2020-11-12 PROCEDURE — 87880 STREP A ASSAY W/OPTIC: CPT

## 2020-11-12 PROCEDURE — 74011250637 HC RX REV CODE- 250/637: Performed by: NURSE PRACTITIONER

## 2020-11-12 PROCEDURE — 71045 X-RAY EXAM CHEST 1 VIEW: CPT

## 2020-11-12 PROCEDURE — 99218 HC RM OBSERVATION: CPT

## 2020-11-12 PROCEDURE — 83735 ASSAY OF MAGNESIUM: CPT

## 2020-11-12 PROCEDURE — 74011250636 HC RX REV CODE- 250/636: Performed by: NURSE PRACTITIONER

## 2020-11-12 PROCEDURE — 85610 PROTHROMBIN TIME: CPT

## 2020-11-12 PROCEDURE — 84484 ASSAY OF TROPONIN QUANT: CPT

## 2020-11-12 PROCEDURE — 80053 COMPREHEN METABOLIC PANEL: CPT

## 2020-11-12 PROCEDURE — 87070 CULTURE OTHR SPECIMN AEROBIC: CPT

## 2020-11-12 PROCEDURE — 83036 HEMOGLOBIN GLYCOSYLATED A1C: CPT

## 2020-11-12 PROCEDURE — 81001 URINALYSIS AUTO W/SCOPE: CPT

## 2020-11-12 PROCEDURE — 85025 COMPLETE CBC W/AUTO DIFF WBC: CPT

## 2020-11-12 PROCEDURE — 96376 TX/PRO/DX INJ SAME DRUG ADON: CPT

## 2020-11-12 PROCEDURE — 93005 ELECTROCARDIOGRAM TRACING: CPT

## 2020-11-12 PROCEDURE — 74011250636 HC RX REV CODE- 250/636: Performed by: EMERGENCY MEDICINE

## 2020-11-12 PROCEDURE — 87804 INFLUENZA ASSAY W/OPTIC: CPT

## 2020-11-12 RX ORDER — ENOXAPARIN SODIUM 100 MG/ML
40 INJECTION SUBCUTANEOUS DAILY
Status: DISCONTINUED | OUTPATIENT
Start: 2020-11-13 | End: 2020-11-13 | Stop reason: HOSPADM

## 2020-11-12 RX ORDER — SODIUM CHLORIDE 0.9 % (FLUSH) 0.9 %
5-40 SYRINGE (ML) INJECTION AS NEEDED
Status: DISCONTINUED | OUTPATIENT
Start: 2020-11-12 | End: 2020-11-13 | Stop reason: HOSPADM

## 2020-11-12 RX ORDER — ONDANSETRON 2 MG/ML
4 INJECTION INTRAMUSCULAR; INTRAVENOUS
Status: DISCONTINUED | OUTPATIENT
Start: 2020-11-12 | End: 2020-11-13 | Stop reason: HOSPADM

## 2020-11-12 RX ORDER — GUAIFENESIN 100 MG/5ML
81 LIQUID (ML) ORAL DAILY
Status: DISCONTINUED | OUTPATIENT
Start: 2020-11-13 | End: 2020-11-13 | Stop reason: HOSPADM

## 2020-11-12 RX ORDER — SODIUM CHLORIDE 9 MG/ML
100 INJECTION, SOLUTION INTRAVENOUS ONCE
Status: COMPLETED | OUTPATIENT
Start: 2020-11-12 | End: 2020-11-13

## 2020-11-12 RX ORDER — ALBUTEROL SULFATE 90 UG/1
2 AEROSOL, METERED RESPIRATORY (INHALATION)
Status: DISCONTINUED | OUTPATIENT
Start: 2020-11-12 | End: 2020-11-13 | Stop reason: HOSPADM

## 2020-11-12 RX ORDER — ACETAMINOPHEN 650 MG/1
650 SUPPOSITORY RECTAL
Status: DISCONTINUED | OUTPATIENT
Start: 2020-11-12 | End: 2020-11-13 | Stop reason: HOSPADM

## 2020-11-12 RX ORDER — POLYETHYLENE GLYCOL 3350 17 G/17G
17 POWDER, FOR SOLUTION ORAL DAILY PRN
Status: DISCONTINUED | OUTPATIENT
Start: 2020-11-12 | End: 2020-11-13

## 2020-11-12 RX ORDER — KETOROLAC TROMETHAMINE 30 MG/ML
30 INJECTION, SOLUTION INTRAMUSCULAR; INTRAVENOUS
Status: COMPLETED | OUTPATIENT
Start: 2020-11-12 | End: 2020-11-12

## 2020-11-12 RX ORDER — HYDRALAZINE HYDROCHLORIDE 20 MG/ML
10 INJECTION INTRAMUSCULAR; INTRAVENOUS
Status: DISCONTINUED | OUTPATIENT
Start: 2020-11-12 | End: 2020-11-13 | Stop reason: HOSPADM

## 2020-11-12 RX ORDER — TRAMADOL HYDROCHLORIDE 50 MG/1
50 TABLET ORAL
Status: DISCONTINUED | OUTPATIENT
Start: 2020-11-12 | End: 2020-11-13 | Stop reason: HOSPADM

## 2020-11-12 RX ORDER — SODIUM CHLORIDE 0.9 % (FLUSH) 0.9 %
5-40 SYRINGE (ML) INJECTION EVERY 8 HOURS
Status: DISCONTINUED | OUTPATIENT
Start: 2020-11-12 | End: 2020-11-13 | Stop reason: HOSPADM

## 2020-11-12 RX ORDER — ACETAMINOPHEN 325 MG/1
650 TABLET ORAL
Status: DISCONTINUED | OUTPATIENT
Start: 2020-11-12 | End: 2020-11-13 | Stop reason: HOSPADM

## 2020-11-12 RX ORDER — AMLODIPINE BESYLATE 5 MG/1
5 TABLET ORAL DAILY
Status: DISCONTINUED | OUTPATIENT
Start: 2020-11-12 | End: 2020-11-13

## 2020-11-12 RX ORDER — PROMETHAZINE HYDROCHLORIDE 25 MG/1
12.5 TABLET ORAL
Status: DISCONTINUED | OUTPATIENT
Start: 2020-11-12 | End: 2020-11-13 | Stop reason: HOSPADM

## 2020-11-12 RX ADMIN — Medication 10 ML: at 23:30

## 2020-11-12 RX ADMIN — AMLODIPINE BESYLATE 5 MG: 5 TABLET ORAL at 17:57

## 2020-11-12 RX ADMIN — HYDRALAZINE HYDROCHLORIDE 10 MG: 20 INJECTION, SOLUTION INTRAMUSCULAR; INTRAVENOUS at 23:29

## 2020-11-12 RX ADMIN — HYDRALAZINE HYDROCHLORIDE 10 MG: 20 INJECTION, SOLUTION INTRAMUSCULAR; INTRAVENOUS at 18:32

## 2020-11-12 RX ADMIN — KETOROLAC TROMETHAMINE 30 MG: 30 INJECTION, SOLUTION INTRAMUSCULAR at 13:45

## 2020-11-12 NOTE — ED NOTES
Pt taken to room 222. When swabbed per [facility] NP orders, wife became very upset. Stating \"someone will lose their job because a covid test should have been done when pt came in\". Joselito Paniagua MD ordered flu and strep swab initially and were done at that time. Pt was diagnosed with dyspnea on exertion for admission. Pt and wife were extremely pleasant and kind up to this point. Nurse and unit manager attempted to explain situation to wife. Pt was very appreciative of care. Wife was given reassurance of testing policy.

## 2020-11-12 NOTE — ED PROVIDER NOTES
EMERGENCY DEPARTMENT HISTORY AND PHYSICAL EXAM      Date: 11/12/2020  Patient Name: Gustavo Neal.      History of Presenting Illness     Chief Complaint   Patient presents with    Generalized Body Aches       History Provided By: Patient    HPI: Gustavo Quintanilla, 48 y.o. male with a past medical history significant hypertension presents to the ED with cc of cough productive of green sputum for 2 days with associated shortness of breath and body aches and sore throat; patient also indicated urinary frequency overnight; denies fever chills nausea vomiting diarrhea    There are no other complaints, changes, or physical findings at this time. PCP: Morenita Cavazos MD    Current Outpatient Medications   Medication Sig Dispense Refill    hydroCHLOROthiazide (HYDRODIURIL) 12.5 mg tablet Take 12.5 mg by mouth daily.  naproxen (NAPROSYN) 500 mg tablet Take 1 Tab by mouth every twelve (12) hours as needed for Pain. 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 No pertinent surgical history. Family History:  History reviewed. No pertinent family history. Social History:  Social History     Tobacco Use    Smoking status: Current Every Day Smoker     Packs/day: 0.25    Smokeless tobacco: Never Used   Substance Use Topics    Alcohol use: No    Drug use: No       Allergies: Allergies   Allergen Reactions    Pcn [Penicillins] Swelling         Review of Systems     Review of Systems   Constitutional: Positive for fatigue. Negative for chills, diaphoresis and fever. HENT: Positive for sore throat. Negative for rhinorrhea. Eyes: Negative for pain and visual disturbance. Respiratory: Positive for cough and shortness of breath. Cardiovascular: Negative for chest pain and leg swelling.    Gastrointestinal: Negative for abdominal pain and nausea. Endocrine: Negative for polydipsia and polyuria. Genitourinary: Positive for frequency and urgency. Negative for dysuria. Musculoskeletal: Positive for myalgias. Negative for back pain. Skin: Negative for color change and pallor. Neurological: Negative for weakness and numbness. Psychiatric/Behavioral: Negative. Physical Exam     Physical Exam  Vitals signs and nursing note reviewed. Constitutional:       Appearance: He is morbidly obese. HENT:      Head: Normocephalic and atraumatic. Mouth/Throat:      Mouth: Mucous membranes are moist.      Pharynx: No oropharyngeal exudate or posterior oropharyngeal erythema. Eyes:      Extraocular Movements: Extraocular movements intact. Conjunctiva/sclera: Conjunctivae normal.      Pupils: Pupils are equal, round, and reactive to light. Neck:      Musculoskeletal: Normal range of motion and neck supple. Cardiovascular:      Rate and Rhythm: Normal rate and regular rhythm. Pulses: Normal pulses. Heart sounds: Normal heart sounds. Pulmonary:      Effort: Pulmonary effort is normal.      Breath sounds: Normal breath sounds. Abdominal:      General: Bowel sounds are normal.      Palpations: Abdomen is soft. Musculoskeletal:         General: Tenderness present. No swelling or signs of injury. Skin:     General: Skin is warm and dry. Capillary Refill: Capillary refill takes less than 2 seconds. Neurological:      General: No focal deficit present. Mental Status: He is alert and oriented to person, place, and time. Psychiatric:         Mood and Affect: Mood normal.         Behavior: Behavior normal.         Lab and Diagnostic Study Results     Labs -   No results found for this or any previous visit (from the past 12 hour(s)).     Radiologic Studies -   [unfilled]  CT Results  (Last 48 hours)    None        CXR Results  (Last 48 hours)    None          Medical Decision Making and ED Course   - I am the first and primary provider for this patient. - I reviewed the vital signs, available nursing notes, past medical history, past surgical history, family history and social history. - Initial assessment performed. The patients presenting problems have been discussed, and the staff are in agreement with the care plan formulated and outlined with them. I have encouraged them to ask questions as they arise throughout their visit. Vital Signs-Reviewed the patient's vital signs. Patient Vitals for the past 12 hrs:   Temp Pulse Resp BP SpO2   11/12/20 1048 99.2 °F (37.3 °C) (!) 101 18 (!) 165/95 95 %           Records Reviewed: Nursing Notes    The patient presents with chest pain with a differential diagnosis of  ACS, arrhythmia and pnuemonia    ED Course:       ED Course as of Nov 12 1444   Thu Nov 12, 2020   1205 INFLUENZA A & B AG (RAPID TEST) [SB]   1348 EKG rate 85 sinus rhythm normal axis TX interval 181 QT interval 438 no ST elevations or depressions flattened T waves in leads III, aVL, V4, V5 V6    [SB]      ED Course User Index  [SB] Bailee Kevin MD         Provider Notes (Medical Decision Making): MDM           Consultations:       Consultations: -   NONE        Procedures and Critical Care       Performed by: Patria Lynn MD  PROCEDURES:  Procedures         TOBACCO COUNSELING: Upon evaluation, pt expressed that they are a current tobacco user. For approximately 10 minutes, pt has been counseled on the dangers of smoking and was encouraged to quit as soon as possible in order to decrease further risks to their health. Pt has conveyed their understanding of the risks involved should they continue to use tobacco products.         Patria Lynn MD        Disposition     Disposition: Condition stable and improved  Admitted to Observation Unit the case was discussed with the admitting physician Dr. Evangelist Leyva if not discharged  DISCHARGE PLAN:  1. Current Discharge Medication List      CONTINUE these medications which have NOT CHANGED    Details   hydroCHLOROthiazide (HYDRODIURIL) 12.5 mg tablet Take 12.5 mg by mouth daily. naproxen (NAPROSYN) 500 mg tablet Take 1 Tab by mouth every twelve (12) hours as needed for Pain. Qty: 20 Tab, Refills: 0           2. Follow-up Information    None       3. Return to ED if worse   4. Current Discharge Medication List          Diagnosis     Clinical Impression: No diagnosis found. Attestations:    Karen Gonzalez MD    Please note that this dictation was completed with [employer], the computer voice recognition software. Quite often unanticipated grammatical, syntax, homophones, and other interpretive errors are inadvertently transcribed by the computer software. Please disregard these errors. Please excuse any errors that have escaped final proofreading. Thank you.

## 2020-11-12 NOTE — H&P
History and Physical    Patient: Robbie Orta Sr. MRN: [id number]  SSN: [id number]    YOB: 1970  Age: 48 y.o. Sex: male      Subjective:      Nicanor Martínez is a 48 y.o. male with PMH of Hypertension, current daily tobacco use, and asthma who presents to ED w c/o dyspnea. He reports a 1 day history of multiple complaints, to include general malaise, body aches, fever, chills, sore throat, productive cough, chest pain, dyspnea, bilateral leg edema, frequent urination, abdominal pain and diarrhea. He denies syncope, N/V, loss of taste or smell, and denies travel or ill contacts. In ED, initial troponin and 1H negative. EKG is Sinus, with no ST elevation or depression. CXR negative for infiltrate but shows mild cardiomegaly. Labs generally unremarkable, save an elevated Cr 1.32. Influenza A/B, and Rapid strep negative. Covid swab is collected. He has low grade temp on arrival 99.2, and is hypertensive 160's/110's. He was given IV toradol, and referred for Observation and further management. 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 No pertinent surgical history. History reviewed. No pertinent family history. Social History     Tobacco Use    Smoking status: Current Every Day Smoker     Packs/day: 0.25    Smokeless tobacco: Never Used   Substance Use Topics    Alcohol use: No      Prior to Admission medications    Medication Sig Start Date End Date Taking? Authorizing Provider   hydroCHLOROthiazide (HYDRODIURIL) 12.5 mg tablet Take 12.5 mg by mouth daily. Yes Other, MD Morenita   naproxen (NAPROSYN) 500 mg tablet Take 1 Tab by mouth every twelve (12) hours as needed for Pain.  2/17/18  Yes Byron Serrano MD        Allergies   Allergen Reactions   Vanessa Oleary [Penicillins] Swelling       Review of Systems:  Pertinent items are noted in the History of Present Illness. Objective:     Vitals:    11/12/20 1026 11/12/20 1048 11/12/20 1052   BP:  (!) 165/95    Pulse:  (!) 101    Resp:  18    Temp:  99.2 °F (37.3 °C)    SpO2: 95% 95%    Weight:   (!) 160.6 kg (354 lb)   Height:   6' 1\" (1.854 m)        Physical Exam:  GENERAL: alert, cooperative, no distress  THROAT & NECK: normal and no erythema or exudates noted. LUNG: clear to auscultation bilaterally  HEART: regular rate and rhythm, S1, S2 normal, no murmur, click, rub or gallop  ABDOMEN: soft, non-tender.  Bowel sounds normal. No masses,  no organomegaly  EXTREMITIES:  extremities normal, atraumatic, no cyanosis or edema  SKIN: no rash or abnormalities  NEUROLOGIC: negative findings: alert, oriented x3  cranial nerves II-XII intact  motor strength: full proximally and distally    Assessment:     Hospital Problems  Never Reviewed          Codes Class Noted POA    Dyspnea ICD-10-CM: R06.00  ICD-9-CM: 786.09  11/12/2020 Unknown              Plan:     Place in Observation  DX: Dyspnea, r/o ACS  ELOS <2MN  Activity as tolerated  Cardiac Diet  Reconcile home meds  VTE ppx: LMWH  Medical surrogate is wife Hermelindo Zaldivar [phone]  Pt is a Full Code    Dyspnea  Reports dyspnea +productive cough  No infiltrate on CXR  Influenza A/B, Strep negative  Obtain Covid 19 PCR  Observe Droplet plus precautions   Room air sat 93-94%  Obtain ABG, if hypoxia CT Chest  Supplemental O2 prn  Albuterol MDI Q4H prn    Chest Pain   Initial Trop, 1H Trop negative  No changes to EKG  Monitor on Tele  Trend Trops Q6H x 2  HGBA1c, TSH, Lipid  Aspirin QD  Consult to Cards    MELBA (acute kidney injury) ([facility] [address]  Acute vs Chronic   IVF NS @ 100ml/hr x 1L  Avoid further NSAID use  Obtain UA   Urine studies   Hold HCTZ  Consult Nephrology if needed     Cardiomegaly  Reported on CXR  TTE in AM    Hypertension  's/110's  Hold HCTZ 2/2 Cr  Hydralazine IV prn    Asthma  No s/s of exacerbation  Has not used rescue inhaler in >1 year  No indications for steroids at this time    Tobacco abuse  35 year 1ppd hx  Cessation encouraged  Nicotine patch refused      Total time spent on consultation and coordination of care - 50 mins     Signed By: Gerry Gómez NP     November 12, 2020

## 2020-11-12 NOTE — ASSESSMENT & PLAN NOTE
No s/s of exacerbation  Has not used rescue inhaler in >1 year  No indications for steroids at this time

## 2020-11-12 NOTE — ASSESSMENT & PLAN NOTE
Reports dyspnea +productive cough  No infiltrate on CXR  Influenza A/B, Strep negative  Obtain Covid 19 PCR  Observe Droplet plus precautions   Room air sat 93-94%  Obtain ABG, if hypoxia CT Chest  Supplemental O2 prn  Albuterol MDI Q4H prn

## 2020-11-13 ENCOUNTER — APPOINTMENT (OUTPATIENT)
Dept: VASCULAR SURGERY | Age: 50
End: 2020-11-13
Attending: NURSE PRACTITIONER

## 2020-11-13 VITALS
DIASTOLIC BLOOD PRESSURE: 118 MMHG | TEMPERATURE: 98.6 F | HEIGHT: 73 IN | HEART RATE: 81 BPM | WEIGHT: 315 LBS | BODY MASS INDEX: 41.75 KG/M2 | SYSTOLIC BLOOD PRESSURE: 161 MMHG | RESPIRATION RATE: 19 BRPM | OXYGEN SATURATION: 98 %

## 2020-11-13 LAB
ANION GAP SERPL CALC-SCNC: 11 MMOL/L (ref 5–15)
BACTERIA SPEC CULT: NORMAL
BASOPHILS # BLD: 0.1 K/UL (ref 0–0.2)
BASOPHILS NFR BLD: 1 % (ref 0–2.5)
BUN SERPL-MCNC: 11 MG/DL (ref 6–20)
BUN/CREAT SERPL: 10 (ref 12–20)
CA-I BLD-MCNC: 9 MG/DL (ref 8.5–10.1)
CHLORIDE SERPL-SCNC: 99 MMOL/L (ref 97–108)
CHOLEST SERPL-MCNC: 188 MG/DL
CO2 SERPL-SCNC: 23 MMOL/L (ref 21–32)
CREAT SERPL-MCNC: 1.15 MG/DL (ref 0.7–1.3)
ECHO AO ROOT DIAM: 3.62 CM
ECHO AV MEAN GRADIENT: 4.26 MMHG
ECHO AV MEAN VELOCITY: 92.95 CM/S
ECHO AV PEAK GRADIENT: 9.71 MMHG
ECHO AV PEAK VELOCITY: 155.77 CM/S
ECHO AV VTI: 24.74 CM
ECHO LA VOL 2C: 41.8 ML (ref 18–58)
ECHO LA VOL 4C: 35.73 ML (ref 18–58)
ECHO LA VOL BP: 43.21 ML (ref 18–58)
ECHO LA VOL BP: 43.21 ML (ref 18–58)
ECHO LA VOLUME INDEX A2C: 15.24 ML/M2 (ref 16–28)
ECHO LA VOLUME INDEX A4C: 13.03 ML/M2 (ref 16–28)
ECHO LV EDV A2C: 101.9 ML
ECHO LV EDV A2C: 101.9 ML
ECHO LV EDV BP: 59 ML (ref 67–155)
ECHO LV EDV BP: 59 ML (ref 67–155)
ECHO LV EJECTION FRACTION A2C: 75 PERCENT
ECHO LV EJECTION FRACTION A2C: 75 PERCENT
ECHO LV EJECTION FRACTION A4C: 58 PERCENT
ECHO LV EJECTION FRACTION A4C: 58 PERCENT
ECHO LV EJECTION FRACTION BIPLANE: 70.4 PERCENT (ref 55–100)
ECHO LV EJECTION FRACTION BIPLANE: 70.4 PERCENT (ref 55–100)
ECHO LV ESV A2C: 12.52 ML
ECHO LV ESV BP: 17.47 ML (ref 22–58)
ECHO LV ESV BP: 17.47 ML (ref 22–58)
ECHO LV ESV INDEX A2C: 4.6 ML/M2
ECHO LV INTERNAL DIMENSION DIASTOLIC: 4.69 CM (ref 4.2–5.9)
ECHO LV INTERNAL DIMENSION SYSTOLIC: 3.11 CM
ECHO LV IVSD: 1.59 CM (ref 0.6–1)
ECHO LV MASS 2D: 285.8 G (ref 88–224)
ECHO LV MASS INDEX 2D: 104.2 G/M2 (ref 49–115)
ECHO LV POSTERIOR WALL DIASTOLIC: 1.36 CM (ref 0.6–1)
ECHO LVOT PEAK GRADIENT: 4.34 MMHG
ECHO LVOT PEAK VELOCITY: 104.11 CM/S
ECHO LVOT SV: 73.2 ML
ECHO LVOT SV: 73.2 ML
ECHO LVOT VTI: 21.28 CM
ECHO MV A VELOCITY: 107.54 CM/S
ECHO MV AREA PHT: 2.99 CM2
ECHO MV AREA PHT: 2.99 CM2
ECHO MV E DECELERATION TIME (DT): 253.37 MS
ECHO MV E VELOCITY: 63.51 CM/S
ECHO MV E/A RATIO: 0.59
ECHO MV PRESSURE HALF TIME (PHT): 73.48 MS
EOSINOPHIL # BLD: 0.2 K/UL (ref 0–0.7)
EOSINOPHIL NFR BLD: 3 % (ref 0.9–2.9)
ERYTHROCYTE [DISTWIDTH] IN BLOOD BY AUTOMATED COUNT: 14.4 % (ref 11.5–14.5)
EST. AVERAGE GLUCOSE BLD GHB EST-MCNC: 105 MG/DL
GLUCOSE SERPL-MCNC: 116 MG/DL (ref 65–100)
HBA1C MFR BLD: 5.3 % (ref 4–5.6)
HCT VFR BLD AUTO: 44.7 % (ref 41–53)
HDLC SERPL-MCNC: 40 MG/DL
HDLC SERPL: 4.7 {RATIO} (ref 0–5)
HGB BLD-MCNC: 15.2 G/DL (ref 13.5–17.5)
LDLC SERPL CALC-MCNC: 129.2 MG/DL (ref 0–100)
LIPID PROFILE,FLP: ABNORMAL
LVOT MG: 2.79 MMHG
LYMPHOCYTES # BLD: 1.6 K/UL (ref 1–4.8)
LYMPHOCYTES NFR BLD: 23 % (ref 20.5–51.1)
MCH RBC QN AUTO: 29.7 PG (ref 31–34)
MCHC RBC AUTO-ENTMCNC: 34 G/DL (ref 31–36)
MCV RBC AUTO: 87.4 FL (ref 80–100)
MONOCYTES # BLD: 0.5 K/UL (ref 0.2–2.4)
MONOCYTES NFR BLD: 7 % (ref 1.7–9.3)
NEUTS SEG # BLD: 4.6 K/UL (ref 1.8–7.7)
NEUTS SEG NFR BLD: 66 % (ref 42–75)
NRBC # BLD: 0 K/UL
NRBC BLD-RTO: 10 PER 100 WBC
PLATELET # BLD AUTO: 204 K/UL
PMV BLD AUTO: 8.3 FL (ref 6.5–11.5)
POTASSIUM SERPL-SCNC: 3.9 MMOL/L (ref 3.5–5.1)
RBC # BLD AUTO: 5.12 M/UL (ref 4.5–5.9)
SARS-COV-2, COV2: NOT DETECTED
SODIUM SERPL-SCNC: 133 MMOL/L (ref 136–145)
SPECIAL REQUESTS,SREQ: NORMAL
SPECIMEN SOURCE: NORMAL
TRIGL SERPL-MCNC: 94 MG/DL (ref ?–150)
TROPONIN I SERPL-MCNC: <0.05 NG/ML
VLDLC SERPL CALC-MCNC: 18.8 MG/DL
WBC # BLD AUTO: 7.1 K/UL (ref 4.4–11.3)

## 2020-11-13 PROCEDURE — 74011250636 HC RX REV CODE- 250/636: Performed by: NURSE PRACTITIONER

## 2020-11-13 PROCEDURE — 74011250637 HC RX REV CODE- 250/637: Performed by: INTERNAL MEDICINE

## 2020-11-13 PROCEDURE — 93306 TTE W/DOPPLER COMPLETE: CPT

## 2020-11-13 PROCEDURE — 36415 COLL VENOUS BLD VENIPUNCTURE: CPT

## 2020-11-13 PROCEDURE — 84484 ASSAY OF TROPONIN QUANT: CPT

## 2020-11-13 PROCEDURE — 96372 THER/PROPH/DIAG INJ SC/IM: CPT

## 2020-11-13 PROCEDURE — 85025 COMPLETE CBC W/AUTO DIFF WBC: CPT

## 2020-11-13 PROCEDURE — 74011250637 HC RX REV CODE- 250/637: Performed by: NURSE PRACTITIONER

## 2020-11-13 PROCEDURE — 80061 LIPID PANEL: CPT

## 2020-11-13 PROCEDURE — 80048 BASIC METABOLIC PNL TOTAL CA: CPT

## 2020-11-13 PROCEDURE — 99218 HC RM OBSERVATION: CPT

## 2020-11-13 RX ORDER — LISINOPRIL 20 MG/1
20 TABLET ORAL DAILY
Status: DISCONTINUED | OUTPATIENT
Start: 2020-11-13 | End: 2020-11-13 | Stop reason: HOSPADM

## 2020-11-13 RX ORDER — AMLODIPINE BESYLATE 10 MG/1
10 TABLET ORAL DAILY
Status: DISCONTINUED | OUTPATIENT
Start: 2020-11-13 | End: 2020-11-13 | Stop reason: HOSPADM

## 2020-11-13 RX ORDER — GUAIFENESIN 100 MG/5ML
81 LIQUID (ML) ORAL DAILY
Qty: 30 TAB | Refills: 0 | Status: SHIPPED | OUTPATIENT
Start: 2020-11-14 | End: 2020-12-14

## 2020-11-13 RX ORDER — AMLODIPINE BESYLATE 10 MG/1
10 TABLET ORAL DAILY
Qty: 30 TAB | Refills: 1 | Status: SHIPPED | OUTPATIENT
Start: 2020-11-14 | End: 2020-12-14

## 2020-11-13 RX ORDER — LISINOPRIL 20 MG/1
20 TABLET ORAL DAILY
Qty: 30 TAB | Refills: 1 | Status: SHIPPED | OUTPATIENT
Start: 2020-11-14 | End: 2020-11-23 | Stop reason: ALTCHOICE

## 2020-11-13 RX ORDER — POLYETHYLENE GLYCOL 3350 17 G/17G
17 POWDER, FOR SOLUTION ORAL DAILY
Status: DISCONTINUED | OUTPATIENT
Start: 2020-11-13 | End: 2020-11-13 | Stop reason: HOSPADM

## 2020-11-13 RX ORDER — TRAMADOL HYDROCHLORIDE 50 MG/1
50 TABLET ORAL
Qty: 12 TAB | Refills: 0 | Status: SHIPPED | OUTPATIENT
Start: 2020-11-13 | End: 2020-11-16

## 2020-11-13 RX ADMIN — ENOXAPARIN SODIUM 40 MG: 40 INJECTION SUBCUTANEOUS at 09:26

## 2020-11-13 RX ADMIN — ASPIRIN 81 MG 81 MG: 81 TABLET ORAL at 09:26

## 2020-11-13 RX ADMIN — AMLODIPINE BESYLATE 10 MG: 10 TABLET ORAL at 09:26

## 2020-11-13 RX ADMIN — POLYETHYLENE GLYCOL 3350 17 G: 17 POWDER, FOR SOLUTION ORAL at 09:26

## 2020-11-13 RX ADMIN — SODIUM CHLORIDE 100 ML/HR: 9 INJECTION, SOLUTION INTRAVENOUS at 00:03

## 2020-11-13 RX ADMIN — LISINOPRIL 20 MG: 20 TABLET ORAL at 09:26

## 2020-11-13 RX ADMIN — Medication 10 ML: at 05:10

## 2020-11-13 NOTE — PROGRESS NOTES
Problem: Falls - Risk of  Goal: *Absence of Falls  Description: Document Felix Choudhury Fall Risk and appropriate interventions in the flowsheet.   11/13/2020 1159 by Carry [place]  Outcome: Resolved/Met  11/13/2020 1158 by Carry [place]  Outcome: Resolved/Met  Note: Fall Risk Interventions:            Medication Interventions: Patient to call before getting OOB, Teach patient to arise slowly                11/13/2020 1157 by Carry [place]  Outcome: Progressing Towards Goal  Note: Fall Risk Interventions:            Medication Interventions: Patient to call before getting OOB, Teach patient to arise slowly                11/13/2020 1157 by Carry [place]  Outcome: Progressing Towards Goal  Note: Fall Risk Interventions:            Medication Interventions: Patient to call before getting OOB, Teach patient to arise slowly                11/13/2020 1142 by Carry [place]  Outcome: Progressing Towards Goal  Note: Fall Risk Interventions:            Medication Interventions: Patient to call before getting OOB, Teach patient to arise slowly                   Problem: Patient Education: Go to Patient Education Activity  Goal: Patient/Family Education  11/13/2020 1159 by Carry [place]  Outcome: Resolved/Met  11/13/2020 1158 by Carry [place]  Outcome: Resolved/Met  11/13/2020 1157 by Carry [place]  Outcome: Progressing Towards Goal  11/13/2020 1157 by Carry [place]  Outcome: Progressing Towards Goal  11/13/2020 1142 by Carry [place]  Outcome: Progressing Towards Goal

## 2020-11-13 NOTE — PROGRESS NOTES
Problem: Falls - Risk of  Goal: *Absence of Falls  Description: Document Leroytony Le Fall Risk and appropriate interventions in the flowsheet.   Outcome: Progressing Towards Goal  Note: Fall Risk Interventions:            Medication Interventions: Patient to call before getting OOB, Teach patient to arise slowly                   Problem: Patient Education: Go to Patient Education Activity  Goal: Patient/Family Education  Outcome: Progressing Towards Goal

## 2020-11-13 NOTE — PROGRESS NOTES
Problem: Falls - Risk of  Goal: *Absence of Falls  Description: Document Marcela Hernandez Fall Risk and appropriate interventions in the flowsheet.   11/13/2020 1157 by Eric Ames  Outcome: Progressing Towards Goal  Note: Fall Risk Interventions:            Medication Interventions: Patient to call before getting OOB, Teach patient to arise slowly                11/13/2020 1157 by Eric Ames  Outcome: Progressing Towards Goal  Note: Fall Risk Interventions:            Medication Interventions: Patient to call before getting OOB, Teach patient to arise slowly                11/13/2020 1142 by Eric Ames  Outcome: Progressing Towards Goal  Note: Fall Risk Interventions:            Medication Interventions: Patient to call before getting OOB, Teach patient to arise slowly                   Problem: Patient Education: Go to Patient Education Activity  Goal: Patient/Family Education  11/13/2020 1157 by Eric Ames  Outcome: Progressing Towards Goal  11/13/2020 1157 by Eric Ames  Outcome: Progressing Towards Goal  11/13/2020 1142 by Eric Ames  Outcome: Progressing Towards Goal

## 2020-11-13 NOTE — DISCHARGE SUMMARY
Physician Discharge Summary     Patient ID:    Aieme Courser  [id number]  48 y.o.  1970    Admit date: 11/12/2020    Discharge date : 11/13/2020    Chronic Diagnoses:    Problem List as of 11/13/2020 Never Reviewed          Codes Class Noted - Resolved    Dyspnea ICD-10-CM: R06.00  ICD-9-CM: 786.09  11/12/2020 - Present        Hypertension ICD-10-CM: I10  ICD-9-CM: 401.9  11/12/2020 - Present        Tobacco abuse ICD-10-CM: Z72.0  ICD-9-CM: 305.1  11/12/2020 - Present        MELBA (acute kidney [facility]) ([facility] ICD-10-CM: N17.9  ICD-9-CM: 584.9  11/12/2020 - Present        Cardiomegaly ICD-10-CM: I51.7  ICD-9-CM: 429.3  11/12/2020 - Present        Asthma ICD-10-CM: J45.909  ICD-9-CM: 493.90  11/12/2020 - Present          22    Final Diagnoses:   Dyspnea [R06.00]   Obstructive sleep apnea noncompliant with home CPAP  Uncontrolled hypertension    Reason for Hospitalization: Shortness of breath        Hospital Course: 48year-old morbidly obese gentleman with history of essential hypertension, obstructive sleep apnea noncompliant with home CPAP admitted for shortness of breath. Patient is on room air without respiratory distress. Systolic blood pressure over 237/568 diastolic. Notes noncompliance with home CPAP. 2D echocardiogram will be obtained this morning after which patient could discharge home. He has been advised self quarantine at home until he gets results of Covid from the hospital.  Encourage use of home CPAP                Discharge Medications:   Current Discharge Medication List      START taking these medications    Details   amLODIPine (NORVASC) 10 mg tablet Take 1 Tab by mouth daily for 30 days. Indications: high blood pressure  Qty: 30 Tab, Refills: 1      aspirin 81 mg chewable tablet Take 1 Tab by mouth daily for 30 days. Qty: 30 Tab, Refills: 0      lisinopriL (PRINIVIL, ZESTRIL) 20 mg tablet Take 1 Tab by mouth daily for 30 days.   Qty: 30 Tab, Refills: 1 traMADoL (ULTRAM) 50 mg tablet Take 1 Tab by mouth every six (6) hours as needed for Pain for up to 3 days. Max Daily Amount: 200 mg. Qty: 12 Tab, Refills: 0    Associated Diagnoses: Neck pain         CONTINUE these medications which have NOT CHANGED    Details   naproxen (NAPROSYN) 500 mg tablet Take 1 Tab by mouth every twelve (12) hours as needed for Pain. Qty: 20 Tab, Refills: 0         STOP taking these medications       hydroCHLOROthiazide (HYDRODIURIL) 12.5 mg tablet Comments:   Reason for Stopping: Follow up Care:    1. None in 1-2 weeks. Please call to set up an appointment shortly after discharge. Diet:  Cardiac Diet    Disposition:  Home. Advanced Directive:   FULL    DNR      Discharge Exam:  Visit Vitals  BP (!) 161/118 (BP 1 Location: Left arm)   Pulse 81   Temp 98.6 °F (37 °C)   Resp 19   Ht 6' 1\" (1.854 m)   Wt (!) 160.6 kg (354 lb)   SpO2 98%   BMI 46.70 kg/m²      O2 Device: Room air    Temp (24hrs), Av.4 °F (36.9 °C), Min:98 °F (36.7 °C), Max:99.2 °F (37.3 °C)    No intake/output data recorded. No intake/output data recorded. General:  Alert, cooperative, no distress, appears stated age. Lungs:   Clear to auscultation bilaterally. Chest wall:  No tenderness or deformity. Heart:  Regular rate and rhythm, S1, S2 normal, no murmur, click, rub or gallop. Abdomen:   Soft, non-tender. Bowel sounds normal. No masses,  No organomegaly. Extremities: Extremities normal, atraumatic, no cyanosis or edema. Pulses: 2+ and symmetric all extremities. Skin: Skin color, texture, turgor normal. No rashes or lesions   Neurologic: CNII-XII intact. No gross sensory or motor deficits         CONSULTATIONS: None    Significant Diagnostic Studies:   2020: BUN 11 mg/dL (Ref range: 6 - 20 mg/dL); Calcium 9.1 mg/dL (Ref range: 8.5 - 10.1 mg/dL); CO2 28 mmol/L (Ref range: 21 - 32 mmol/L); Creatinine 1.32 mg/dL* (Ref range: 0.70 - 1.30 mg/dL);  Glucose 92 mg/dL (Ref range: 65 - 100 mg/dL); HCT 46.6 % (Ref range: 41 - 53 %); HGB 15.8 g/dL (Ref range: 13.5 - 17.5 g/dL); Potassium 4.1 mmol/L (Ref range: 3.5 - 5.1 mmol/L); Sodium 135 mmol/L* (Ref range: 136 - 145 mmol/L)  11/13/2020: BUN 11 mg/dL (Ref range: 6 - 20 mg/dL); Calcium 9.0 mg/dL (Ref range: 8.5 - 10.1 mg/dL); CO2 23 mmol/L (Ref range: 21 - 32 mmol/L); Creatinine 1.15 mg/dL (Ref range: 0.70 - 1.30 mg/dL); Glucose 116 mg/dL* (Ref range: 65 - 100 mg/dL); HCT 44.7 % (Ref range: 41 - 53 %); HGB 15.2 g/dL (Ref range: 13.5 - 17.5 g/dL); Potassium 3.9 mmol/L (Ref range: 3.5 - 5.1 mmol/L); Sodium 133 mmol/L* (Ref range: 136 - 145 mmol/L)  Recent Labs     11/13/20  0554 11/12/20  1205   WBC 7.1 9.3   HGB 15.2 15.8   HCT 44.7 46.6    222     Recent Labs     11/13/20  0554 11/12/20  1205   * 135*   K 3.9 4.1   CL 99 100   CO2 23 28   BUN 11 11   CREA 1.15 1.32*   * 92   CA 9.0 9.1   MG  --  1.8     Recent Labs     11/12/20  1205   ALT 20   AP 75   TBILI 0.8   TP 8.0   ALB 3.8   GLOB 4.2*     Recent Labs     11/12/20  1205   INR 1.0   PTP 9.9      No results for input(s): FE, TIBC, PSAT, FERR in the last 72 hours. No results for input(s): PH, PCO2, PO2 in the last 72 hours. No results for input(s): CPK, CKMB in the last 72 hours.     No lab exists for component: TROPONINI  Lab Results   Component Value Date/Time    Glucose (POC) 85 03/03/2019 12:05 AM       Total Time: 25 minutes    Signed:  Keiry Sheffield MD  11/13/2020  10:24 AM

## 2020-11-13 NOTE — PROGRESS NOTES
Problem: Falls - Risk of  Goal: *Absence of Falls  Description: Document Luci Mak Fall Risk and appropriate interventions in the flowsheet.   11/13/2020 1158 by Berna Perry  Outcome: Resolved/Met  Note: Fall Risk Interventions:            Medication Interventions: Patient to call before getting OOB, Teach patient to arise slowly                11/13/2020 1157 by Berna Perry  Outcome: Progressing Towards Goal  Note: Fall Risk Interventions:            Medication Interventions: Patient to call before getting OOB, Teach patient to arise slowly                11/13/2020 1157 by Berna Perry  Outcome: Progressing Towards Goal  Note: Fall Risk Interventions:            Medication Interventions: Patient to call before getting OOB, Teach patient to arise slowly                11/13/2020 1142 by Berna Perry  Outcome: Progressing Towards Goal  Note: Fall Risk Interventions:            Medication Interventions: Patient to call before getting OOB, Teach patient to arise slowly                   Problem: Patient Education: Go to Patient Education Activity  Goal: Patient/Family Education  11/13/2020 1158 by Berna Perry  Outcome: Resolved/Met  11/13/2020 1157 by Berna Perry  Outcome: Progressing Towards Goal  11/13/2020 1157 by Berna Perry  Outcome: Progressing Towards Goal  11/13/2020 1142 by Berna Perry  Outcome: Progressing Towards Goal

## 2020-11-13 NOTE — DISCHARGE INSTRUCTIONS
Follow up Care:    1. None in 1-2 weeks. Please call to set up an appointment shortly after discharge.       Diet:  Cardiac Diet     Disposition:  Home.

## 2020-11-23 ENCOUNTER — OFFICE VISIT (OUTPATIENT)
Dept: PRIMARY CARE CLINIC | Age: 50
End: 2020-11-23

## 2020-11-23 VITALS
OXYGEN SATURATION: 98 % | HEART RATE: 74 BPM | BODY MASS INDEX: 40.43 KG/M2 | HEIGHT: 74 IN | WEIGHT: 315 LBS | SYSTOLIC BLOOD PRESSURE: 165 MMHG | TEMPERATURE: 97.4 F | RESPIRATION RATE: 20 BRPM | DIASTOLIC BLOOD PRESSURE: 95 MMHG

## 2020-11-23 DIAGNOSIS — Z09 HOSPITAL DISCHARGE FOLLOW-UP: ICD-10-CM

## 2020-11-23 DIAGNOSIS — I10 ESSENTIAL HYPERTENSION: ICD-10-CM

## 2020-11-23 DIAGNOSIS — F17.200 TOBACCO DEPENDENCE: ICD-10-CM

## 2020-11-23 DIAGNOSIS — Z23 ENCOUNTER FOR IMMUNIZATION: Primary | ICD-10-CM

## 2020-11-23 DIAGNOSIS — R06.02 SHORTNESS OF BREATH: ICD-10-CM

## 2020-11-23 DIAGNOSIS — Z12.11 ENCOUNTER FOR SCREENING COLONOSCOPY: ICD-10-CM

## 2020-11-23 DIAGNOSIS — E66.01 OBESITY, MORBID (HCC): ICD-10-CM

## 2020-11-23 DIAGNOSIS — E78.2 MIXED HYPERLIPIDEMIA: ICD-10-CM

## 2020-11-23 DIAGNOSIS — I51.7 CARDIOMEGALY: ICD-10-CM

## 2020-11-23 PROCEDURE — 90686 IIV4 VACC NO PRSV 0.5 ML IM: CPT | Performed by: NURSE PRACTITIONER

## 2020-11-23 PROCEDURE — 99204 OFFICE O/P NEW MOD 45 MIN: CPT | Performed by: NURSE PRACTITIONER

## 2020-11-23 RX ORDER — TRAMADOL HYDROCHLORIDE 50 MG/1
50 TABLET ORAL
COMMUNITY
End: 2021-02-15 | Stop reason: ALTCHOICE

## 2020-11-23 RX ORDER — LISINOPRIL AND HYDROCHLOROTHIAZIDE 12.5; 2 MG/1; MG/1
1 TABLET ORAL DAILY
Qty: 30 TAB | Refills: 2 | Status: SHIPPED | OUTPATIENT
Start: 2020-11-23 | End: 2022-09-16 | Stop reason: SDUPTHER

## 2020-11-23 NOTE — PROGRESS NOTES
Nathaniel Dalton is a 48 y.o. male who presents to the office today for the following:    Chief Complaint   Patient presents with   [facility] Follow Up     ER F/U. States spent one night. States had all the symptoms of Covid and Flu but test was Neg for both. (11/14/2020). 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 No pertinent surgical history. Family History   Problem Relation Age of Onset    Diabetes Mother     Hypertension Mother         Social History     Tobacco Use    Smoking status: Current Every Day Smoker     Packs/day: 0.25     Types: Cigarettes    Smokeless tobacco: Never Used   Substance Use Topics    Alcohol use: No    Drug use: No        HPI  Patient here as new patient to establish care and for hospital follow up. Has h/o hypertension, hyperlipidemia,obesity and sleep apnea. States had been non-compliant with medication and cpap prior to to admission. Went to hospital with c/o congestion and sob. States that tests done to rule out covid 19 and was negative for that as well as flu. He feels better now and reports no more congestion. Does still get sob at times but states this is his baseline which he attributes to weight. Has been compliant with medications since being home. Current Outpatient Medications on File Prior to Visit   Medication Sig    traMADoL (ULTRAM) 50 mg tablet Take 50 mg by mouth every six (6) hours as needed for Pain (Was given #12 tablets thru ER.).  amLODIPine (NORVASC) 10 mg tablet Take 1 Tab by mouth daily for 30 days. Indications: high blood pressure    aspirin 81 mg chewable tablet Take 1 Tab by mouth daily for 30 days.  naproxen (NAPROSYN) 500 mg tablet Take 1 Tab by mouth every twelve (12) hours as needed for Pain.      No current facility-administered medications on file prior to visit. Medications Ordered Today   Medications    lisinopril-hydroCHLOROthiazide (PRINZIDE, ZESTORETIC) 20-12.5 mg per tablet     Sig: Take 1 Tab by mouth daily. Dispense:  30 Tab     Refill:  2        Review of Systems   Constitutional: Negative. HENT: Negative. Eyes: Negative. Respiratory: Positive for shortness of breath. Negative for cough, hemoptysis, sputum production and wheezing. Cardiovascular: Negative. Gastrointestinal: Negative. Genitourinary: Negative. Musculoskeletal: Positive for myalgias (occasional ). Skin: Negative. Neurological: Negative. Psychiatric/Behavioral: Negative. Visit Vitals  BP (!) 165/95 (BP 1 Location: Right arm, BP Patient Position: Sitting)   Pulse 74   Temp 97.4 °F (36.3 °C) (Skin)   Resp 20   Ht 6' 2\" (1.88 m)   Wt (!) 360 lb 2 oz (163.4 kg)   SpO2 98%   BMI 46.24 kg/m²       Physical Exam  Vitals signs and nursing note reviewed. Constitutional:       Appearance: Normal appearance. He is obese. HENT:      Head: Normocephalic and atraumatic. Right Ear: Tympanic membrane normal.      Left Ear: Tympanic membrane normal.      Mouth/Throat:      Mouth: Mucous membranes are moist.      Pharynx: Oropharynx is clear. Eyes:      Pupils: Pupils are equal, round, and reactive to light. Cardiovascular:      Rate and Rhythm: Normal rate and regular rhythm. Pulses: Normal pulses. Heart sounds: Normal heart sounds. Pulmonary:      Effort: Pulmonary effort is normal.      Breath sounds: Normal breath sounds. Abdominal:      General: Bowel sounds are normal.      Palpations: Abdomen is soft. There is no mass. Tenderness: There is no abdominal tenderness. There is no right CVA tenderness, left CVA tenderness, guarding or rebound. Hernia: No hernia is present. Musculoskeletal: Normal range of motion. Skin:     General: Skin is warm and dry.    Neurological: Mental Status: He is alert. Mental status is at baseline. 1. Hospital discharge follow-up    - NC DISCHARGE MEDS RECONCILED W/ CURRENT OUTPATIENT MED LIST    2. Essential hypertension  Blood pressure remains not at goal  Adding HCTZ to lisinopril 20mg daily along with amlodipine 10mg daily that he is taking  Monitor at home and bring readings to next appointment    3. Tobacco dependence  Discussed smoking cessation and options to help with this    4. Encounter for immunization  Updated annual influenza vaccine  - INFLUENZA VACCINE (CCIIV4 VACCINE ANTIBIO FREE 0.5 ML)    5. Shortness of breath  Covid and flu tests negative and reports at his baseline he gets sob but was worse on day of admission. CXR showed cardiomegaly and EKG showed sinus rhythm  Echo ordered but no results yet  He was told to follow up with cardiology and referral sent (he will let us know if does not receive appointment)  He is also a smoker and probable underlying copd as well as weight are contribuatory factors. - REFERRAL TO CARDIOLOGY    6. Obesity, morbid ([address]  We discussed his weight and various dietary changes that I feel he will benefit from  He plans to start a walking regimen at least 3 times per week with a family member    7. Cardiomegaly  See above    8. Encounter for screening colonoscopy  Refer for screening colonscopy  - COLONOSCOPY; Future  - REFERRAL TO GASTROENTEROLOGY    9. Hyperlipidemia    He is going to work on dietary and exercise changes to help lose weight. Repeat lipid panel in 3 months to re-evaluate    Patient verbalizes understanding of plan of care as discussed above    Follow-up and Dispositions    · Return in about 3 months (around 2/23/2021) for or sooner for worsening symptoms.

## 2020-12-07 ENCOUNTER — OFFICE VISIT (OUTPATIENT)
Dept: PRIMARY CARE CLINIC | Age: 50
End: 2020-12-07

## 2020-12-07 DIAGNOSIS — Z23 ENCOUNTER FOR IMMUNIZATION: Primary | ICD-10-CM

## 2020-12-07 PROCEDURE — 90471 IMMUNIZATION ADMIN: CPT | Performed by: NURSE PRACTITIONER

## 2021-02-11 LAB — EF %, EXTERNAL: NORMAL

## 2021-02-15 ENCOUNTER — OFFICE VISIT (OUTPATIENT)
Dept: PRIMARY CARE CLINIC | Age: 51
End: 2021-02-15
Payer: COMMERCIAL

## 2021-02-15 ENCOUNTER — TELEPHONE (OUTPATIENT)
Dept: GASTROENTEROLOGY | Age: 51
End: 2021-02-15

## 2021-02-15 VITALS
RESPIRATION RATE: 18 BRPM | SYSTOLIC BLOOD PRESSURE: 164 MMHG | DIASTOLIC BLOOD PRESSURE: 94 MMHG | OXYGEN SATURATION: 98 % | TEMPERATURE: 97.1 F | HEART RATE: 88 BPM | WEIGHT: 315 LBS | BODY MASS INDEX: 46.22 KG/M2

## 2021-02-15 DIAGNOSIS — E66.01 OBESITY, MORBID (HCC): ICD-10-CM

## 2021-02-15 DIAGNOSIS — I51.7 CARDIOMEGALY: ICD-10-CM

## 2021-02-15 DIAGNOSIS — E78.2 MIXED HYPERLIPIDEMIA: ICD-10-CM

## 2021-02-15 DIAGNOSIS — I10 ESSENTIAL HYPERTENSION: Primary | ICD-10-CM

## 2021-02-15 DIAGNOSIS — F17.200 TOBACCO DEPENDENCE: ICD-10-CM

## 2021-02-15 PROBLEM — N17.9 AKI (ACUTE KIDNEY INJURY) (HCC): Status: RESOLVED | Noted: 2020-11-12 | Resolved: 2021-02-15

## 2021-02-15 PROCEDURE — 99214 OFFICE O/P EST MOD 30 MIN: CPT | Performed by: NURSE PRACTITIONER

## 2021-02-15 RX ORDER — GUAIFENESIN 100 MG/5ML
81 LIQUID (ML) ORAL DAILY
COMMUNITY

## 2021-02-15 RX ORDER — AMLODIPINE BESYLATE 5 MG/1
5 TABLET ORAL DAILY
Qty: 90 TAB | Refills: 0 | Status: SHIPPED | OUTPATIENT
Start: 2021-02-15 | End: 2021-03-19 | Stop reason: ALTCHOICE

## 2021-02-15 RX ORDER — POLYETHYLENE GLYCOL 3350 17 G/17G
POWDER, FOR SOLUTION ORAL
Qty: 510 G | Refills: 0 | Status: SHIPPED | OUTPATIENT
Start: 2021-02-15 | End: 2021-02-26

## 2021-02-15 NOTE — PROGRESS NOTES
1. Have you been to the ER, urgent care clinic since your last visit? Hospitalized since your last visit? No    2. Have you seen or consulted any other health care providers outside of the [address] [place] [address] since your last visit?   Include any pap smears or colon screening. [facility] last visit was Monday 02/08/2021 and echo done on 02/11/2021, F/U on 02/24/2021

## 2021-02-15 NOTE — PROGRESS NOTES
Jairo Watts is a 48 y.o. 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 No pertinent surgical history. Family History   Problem Relation Age of Onset    Diabetes Mother     Hypertension Mother         Social History     Tobacco Use    Smoking status: Current Every Day Smoker     Packs/day: 0.25     Types: Cigarettes    Smokeless tobacco: Never Used   Substance Use Topics    Alcohol use: No    Drug use: No        HPI   Patient here for follow up with PMH of hypertension, asthma, hyperlipidemia,obesity and sleep apnea. States that he has been feeling well since last visit and is taking medications as directed. Has seen cardiologist with both stress test and echo completed. Was told that stress test was normal but has not received results from echo. Has appointment to follow up with them in 1 week. Current Outpatient Medications on File Prior to Visit   Medication Sig    aspirin 81 mg chewable tablet Take 81 mg by mouth daily.  lisinopril-hydroCHLOROthiazide (PRINZIDE, ZESTORETIC) 20-12.5 mg per tablet Take 1 Tab by mouth daily.  [DISCONTINUED] traMADoL (ULTRAM) 50 mg tablet Take 50 mg by mouth every six (6) hours as needed for Pain (Was given #12 tablets thru ER.).  [DISCONTINUED] naproxen (NAPROSYN) 500 mg tablet Take 1 Tab by mouth every twelve (12) hours as needed for Pain. No current facility-administered medications on file prior to visit. Medications Ordered Today   Medications    amLODIPine (NORVASC) 5 mg tablet     Sig: Take 1 Tab by mouth daily.      Dispense:  90 Tab     Refill:  0        Review of Systems   Constitutional: Negative. HENT: Negative. Eyes: Negative. Respiratory: Negative for cough, hemoptysis, sputum production, shortness of breath and wheezing. Cardiovascular: Negative. Gastrointestinal: Negative. Genitourinary: Negative. Musculoskeletal: Positive for myalgias (occasional ). Skin: Negative. Neurological: Negative. Psychiatric/Behavioral: Negative. Visit Vitals  BP (!) 164/94   Pulse 88   Temp 97.1 °F (36.2 °C) (Tympanic)   Resp 18   Wt (!) 360 lb (163.3 kg)   SpO2 98%   BMI 46.22 kg/m²       Physical Exam  Vitals signs and nursing note reviewed. Constitutional:       Appearance: Normal appearance. He is obese. Cardiovascular:      Rate and Rhythm: Normal rate and regular rhythm. Heart sounds: Normal heart sounds. Pulmonary:      Effort: Pulmonary effort is normal.      Breath sounds: Normal breath sounds. Abdominal:      General: Bowel sounds are normal.      Palpations: Abdomen is soft. There is no mass. Tenderness: There is no abdominal tenderness. Musculoskeletal: Normal range of motion. Skin:     General: Skin is warm and dry. Neurological:      Mental Status: He is alert. Mental status is at baseline. 1. Essential hypertension  Blood pressure is not controlled but is improved with medication  Will add amlodipine 5mg daily along with continuing his lisinopril/HCTZ  Encouraged to obtain a home bp cuff to check and bring readings to next appintment in 1 month  Labs up to date  - amLODIPine (NORVASC) 5 mg tablet; Take 1 Tab by mouth daily. Dispense: 90 Tab; Refill: 0    2. Obesity, morbid ([address]  Continue to encourage weight loss. Recommend decreasing excess fat, salt and sugar in diet along with getting regular exercise 3-5 times weekly for 30-45 minutes consistently. 3. Tobacco dependence  Continue to encourage smoking cessation    4.  Cardiomegaly  Patient reports stress test was normal and awaiting echo results  Will try to obtain an updated cardiology note  He has follow up scheduled    5. Mixed hyperlipidemia    He is going to work on dietary and exercise changes to help lose weight. Repeat lipid panel at 3 months to re-evaluate    6. Sleep apnea  Not using nightly and encouraged      Patient verbalizes understanding of plan of care as discussed above    Follow-up and Dispositions    · Return in about 4 weeks (around 3/15/2021) for or sooner for worsening symptoms.

## 2021-02-15 NOTE — TELEPHONE ENCOUNTER
I talked with [facility] about scheduling screening colonoscopy. He chose 2-. He has to go back to work in March. He will come by tomorrow am to  his orders and instructions.

## 2021-02-15 NOTE — H&P (VIEW-ONLY)
Myah Wheat is a 48 y.o. male who presents to the office today for the following: Chief Complaint Patient presents with  Hypertension  Medication Refill Past Medical History:  
Diagnosis Date  [facility]) [facility] 11/12/2020  Asthma  Gastrointestinal disorder   
 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 No pertinent surgical history. Family History Problem Relation Age of Onset  Diabetes Mother  Hypertension Mother Social History Tobacco Use  Smoking status: Current Every Day Smoker Packs/day: 0.25 Types: Cigarettes  Smokeless tobacco: Never Used Substance Use Topics  Alcohol use: No  
 Drug use: No  
  
 
HPI Patient here for follow up with PMH of hypertension, asthma, hyperlipidemia,obesity and sleep apnea. States that he has been feeling well since last visit and is taking medications as directed. Has seen cardiologist with both stress test and echo completed. Was told that stress test was normal but has not received results from echo. Has appointment to follow up with them in 1 week. Current Outpatient Medications on File Prior to Visit Medication Sig  
 aspirin 81 mg chewable tablet Take 81 mg by mouth daily.  lisinopril-hydroCHLOROthiazide (PRINZIDE, ZESTORETIC) 20-12.5 mg per tablet Take 1 Tab by mouth daily.  [DISCONTINUED] traMADoL (ULTRAM) 50 mg tablet Take 50 mg by mouth every six (6) hours as needed for Pain (Was given #12 tablets thru ER.).  [DISCONTINUED] naproxen (NAPROSYN) 500 mg tablet Take 1 Tab by mouth every twelve (12) hours as needed for Pain. No current facility-administered medications on file prior to visit. Medications Ordered Today Medications  amLODIPine (NORVASC) 5 mg tablet Sig: Take 1 Tab by mouth daily. Dispense:  90 Tab   Refill:  0  
  
 
 Review of Systems Constitutional: Negative. HENT: Negative. Eyes: Negative. Respiratory: Negative for cough, hemoptysis, sputum production, shortness of breath and wheezing. Cardiovascular: Negative. Gastrointestinal: Negative. Genitourinary: Negative. Musculoskeletal: Positive for myalgias (occasional ). Skin: Negative. Neurological: Negative. Psychiatric/Behavioral: Negative. Visit Vitals BP (!) 164/94 Pulse 88 Temp 97.1 °F (36.2 °C) (Tympanic) Resp 18 Wt (!) 360 lb (163.3 kg) SpO2 98% BMI 46.22 kg/m² Physical Exam 
Vitals signs and nursing note reviewed. Constitutional:   
   Appearance: Normal appearance. He is obese. Cardiovascular:  
   Rate and Rhythm: Normal rate and regular rhythm. Heart sounds: Normal heart sounds. Pulmonary:  
   Effort: Pulmonary effort is normal.  
   Breath sounds: Normal breath sounds. Abdominal:  
   General: Bowel sounds are normal.  
   Palpations: Abdomen is soft. There is no mass. Tenderness: There is no abdominal tenderness. Musculoskeletal: Normal range of motion. Skin: 
   General: Skin is warm and dry. Neurological:  
   Mental Status: He is alert. Mental status is at baseline. 1. Essential hypertension Blood pressure is not controlled but is improved with medication Will add amlodipine 5mg daily along with continuing his lisinopril/HCTZ Encouraged to obtain a home bp cuff to check and bring readings to next appintment in 1 month Labs up to date 
- amLODIPine (NORVASC) 5 mg tablet; Take 1 Tab by mouth daily. Dispense: 90 Tab; Refill: 0 
 
2. Obesity, morbid ([address] Continue to encourage weight loss. Recommend decreasing excess fat, salt and sugar in diet along with getting regular exercise 3-5 times weekly for 30-45 minutes consistently. 3. Tobacco dependence Continue to encourage smoking cessation 4. Cardiomegaly Patient reports stress test was normal and awaiting echo results Will try to obtain an updated cardiology note He has follow up scheduled 5. Mixed hyperlipidemia  He is going to work on dietary and exercise changes to help lose weight. Repeat lipid panel at 3 months to re-evaluate 6. Sleep apnea Not using nightly and encouraged Patient verbalizes understanding of plan of care as discussed above Follow-up and Dispositions · Return in about 4 weeks (around 3/15/2021) for or sooner for worsening symptoms.

## 2021-02-16 ENCOUNTER — TELEPHONE (OUTPATIENT)
Dept: GASTROENTEROLOGY | Age: 51
End: 2021-02-16

## 2021-02-16 NOTE — TELEPHONE ENCOUNTER
I called [facility] at [phone] and spoke with Lindy Mclain said No PA required for colonoscopy, Ref # [id number].  2- at 10:20 am

## 2021-02-19 ENCOUNTER — HOSPITAL ENCOUNTER (OUTPATIENT)
Dept: PREADMISSION TESTING | Age: 51
Discharge: HOME | End: 2021-02-19
Payer: COMMERCIAL

## 2021-02-19 LAB — SARS-COV-2, COV2: NORMAL

## 2021-02-19 PROCEDURE — U0003 INFECTIOUS AGENT DETECTION BY NUCLEIC ACID (DNA OR RNA); SEVERE ACUTE RESPIRATORY SYNDROME CORONAVIRUS 2 (SARS-COV-2) (CORONAVIRUS DISEASE [COVID-19]), AMPLIFIED PROBE TECHNIQUE, MAKING USE OF HIGH THROUGHPUT TECHNOLOGIES AS DESCRIBED BY CMS-2020-01-R: HCPCS

## 2021-02-20 LAB — SARS-COV-2, COV2NT: NOT DETECTED

## 2021-02-26 ENCOUNTER — ANESTHESIA EVENT (OUTPATIENT)
Dept: ENDOSCOPY | Age: 51
End: 2021-02-26
Payer: COMMERCIAL

## 2021-02-26 ENCOUNTER — HOSPITAL ENCOUNTER (OUTPATIENT)
Age: 51
Setting detail: OUTPATIENT SURGERY
Discharge: HOME | End: 2021-02-26
Attending: INTERNAL MEDICINE | Admitting: INTERNAL MEDICINE
Payer: COMMERCIAL

## 2021-02-26 ENCOUNTER — ANESTHESIA (OUTPATIENT)
Dept: ENDOSCOPY | Age: 51
End: 2021-02-26
Payer: COMMERCIAL

## 2021-02-26 VITALS
HEART RATE: 74 BPM | WEIGHT: 315 LBS | OXYGEN SATURATION: 98 % | HEIGHT: 73 IN | BODY MASS INDEX: 41.75 KG/M2 | DIASTOLIC BLOOD PRESSURE: 93 MMHG | SYSTOLIC BLOOD PRESSURE: 155 MMHG | RESPIRATION RATE: 22 BRPM | TEMPERATURE: 97.3 F

## 2021-02-26 PROCEDURE — 76060000031 HC ANESTHESIA FIRST 0.5 HR: Performed by: INTERNAL MEDICINE

## 2021-02-26 PROCEDURE — 2709999900 HC NON-CHARGEABLE SUPPLY: Performed by: INTERNAL MEDICINE

## 2021-02-26 PROCEDURE — 88305 TISSUE EXAM BY PATHOLOGIST: CPT

## 2021-02-26 PROCEDURE — 74011250636 HC RX REV CODE- 250/636: Performed by: INTERNAL MEDICINE

## 2021-02-26 PROCEDURE — 76040000019: Performed by: INTERNAL MEDICINE

## 2021-02-26 PROCEDURE — 74011250636 HC RX REV CODE- 250/636: Performed by: NURSE ANESTHETIST, CERTIFIED REGISTERED

## 2021-02-26 PROCEDURE — 77030019988 HC FCPS ENDOSC DISP BSC -B: Performed by: INTERNAL MEDICINE

## 2021-02-26 PROCEDURE — 45380 COLONOSCOPY AND BIOPSY: CPT | Performed by: INTERNAL MEDICINE

## 2021-02-26 RX ORDER — PROPOFOL 10 MG/ML
INJECTION, EMULSION INTRAVENOUS AS NEEDED
Status: DISCONTINUED | OUTPATIENT
Start: 2021-02-26 | End: 2021-02-26 | Stop reason: HOSPADM

## 2021-02-26 RX ORDER — SODIUM CHLORIDE 9 MG/ML
INJECTION, SOLUTION INTRAVENOUS
Status: DISCONTINUED | OUTPATIENT
Start: 2021-02-26 | End: 2021-02-26 | Stop reason: HOSPADM

## 2021-02-26 RX ORDER — SODIUM CHLORIDE 0.9 % (FLUSH) 0.9 %
5-40 SYRINGE (ML) INJECTION EVERY 8 HOURS
Status: DISCONTINUED | OUTPATIENT
Start: 2021-02-26 | End: 2021-02-26 | Stop reason: HOSPADM

## 2021-02-26 RX ORDER — SODIUM CHLORIDE 9 MG/ML
125 INJECTION, SOLUTION INTRAVENOUS CONTINUOUS
Status: DISCONTINUED | OUTPATIENT
Start: 2021-02-26 | End: 2021-02-26 | Stop reason: HOSPADM

## 2021-02-26 RX ORDER — SODIUM CHLORIDE 0.9 % (FLUSH) 0.9 %
5-40 SYRINGE (ML) INJECTION AS NEEDED
Status: DISCONTINUED | OUTPATIENT
Start: 2021-02-26 | End: 2021-02-26 | Stop reason: HOSPADM

## 2021-02-26 RX ADMIN — SODIUM CHLORIDE 125 ML/HR: 9 INJECTION, SOLUTION INTRAVENOUS at 11:15

## 2021-02-26 RX ADMIN — PROPOFOL 100 MG: 10 INJECTION, EMULSION INTRAVENOUS at 13:12

## 2021-02-26 RX ADMIN — SODIUM CHLORIDE: 9 INJECTION, SOLUTION INTRAVENOUS at 13:00

## 2021-02-26 RX ADMIN — PROPOFOL 100 MG: 10 INJECTION, EMULSION INTRAVENOUS at 13:17

## 2021-02-26 RX ADMIN — PROPOFOL 200 MG: 10 INJECTION, EMULSION INTRAVENOUS at 13:08

## 2021-02-26 NOTE — INTERVAL H&P NOTE
Update History & Physical    The Patient's History and Physical of February 26, 2021 was reviewed with the patient and I examined the patient. There was no change. The surgical site was confirmed by the patient and me. Plan:  The risk, benefits, expected outcome, and alternative to the recommended procedure have been discussed with the patient. Patient understands and wants to proceed with the procedure.     Electronically signed by Liza Cisneros MD on 2/26/2021 at 11:01 AM

## 2021-02-26 NOTE — DISCHARGE INSTRUCTIONS

## 2021-02-26 NOTE — OP NOTES
Colonoscopy Procedure Note      Patient: Veda Peterson MRN: [id number]  SSN: [id number]    YOB: 1970  Age: 48 y.o. Sex: male      Date of Procedure: 2/26/2021  Date/Time:  2/26/2021 1:27 PM       IMPRESSION:     1. Ascending colon polyp       RECOMMENDATIONS:     1) Check biopsy results. 2) Await pathology report. Call me in 2 weeks if you have not received any information from my office regarding your results. 3) Repeat colonoscopy in 2 to 3 years or as determined by the pathology report. INDICATION: Colon screening     PROCEDURE PERFORMED: Colonoscopy with cold biopsies     DESCRIPTION OF PROCEDURE: An informed consent was obtained. The patient was placed in left lateral position. Perianal inspection and a digital rectal exam was performed. Video colonoscope was introduced into the rectum and advanced under direct vision up to the terminal ileum. With adequate insufflation and maneuvering of the withdrawing scope, the colonic mucosa was visualized carefully. Retroflexion was performed in the rectum to see the anorectum and also in the ascending colon to look behind the folds. Vital signs, pulse oximetry, single lead cardiac monitor were monitored throughout the procedure as the sedation was titrated to the desired effect ensuring patient comfort and safety. The patient tolerated the procedure very well and was transferred to the recovery area. Following is the summary of findings: 0.6 cm polyp was removed from the proximal ascending colon. No other mucosal lesions were noted. ENDOSCOPIST: Aj Scott MD      ENDOSCOPE: Olympus videocolonoscope     ASSISTANT:Circ-1: Tai Franklin RN              Scrub Tech-1: Kathryn Pearce     ANESTHESIA: TIVA      QUALITY OF PREPARATION: Good      FINDINGS:   1.  Ascending colon polyp      EBL: Minimal   SPECIMENS:   ID Type Source Tests Collected by Time Destination   1 : ascending colon polyp Preservative Colon, Ascending Edith Chino MD 2/26/2021 1316 Pathology             Pasquale Cardenas MD  February 26, 2021  1:27 PM

## 2021-02-26 NOTE — ANESTHESIA PREPROCEDURE EVALUATION
Relevant Problems   No relevant active problems       Anesthetic History   No history of anesthetic complications            Review of Systems / Medical History  Patient summary reviewed, nursing notes reviewed and pertinent labs reviewed    Pulmonary                   Neuro/Psych   Within defined limits           Cardiovascular                       GI/Hepatic/Renal  Within defined limits              Endo/Other             Other Findings              Physical Exam    Airway  Mallampati: II  TM Distance: 4 - 6 cm  Neck ROM: normal range of motion        Cardiovascular  Regular rate and rhythm,  S1 and S2 normal,  no murmur, click, rub, or gallop             Dental         Pulmonary  Breath sounds clear to auscultation               Abdominal  Abdominal exam normal       Other Findings            Anesthetic Plan    ASA: 3  Anesthesia type: MAC          Induction: Intravenous  Anesthetic plan and risks discussed with: Patient

## 2021-02-26 NOTE — ANESTHESIA POSTPROCEDURE EVALUATION
Procedure(s):  COLONOSCOPY.     MAC    Anesthesia Post Evaluation      Multimodal analgesia: multimodal analgesia used between 6 hours prior to anesthesia start to PACU discharge  Patient location during evaluation: bedside  Patient participation: complete - patient participated  Level of consciousness: awake  Pain management: adequate  Airway patency: patent  Anesthetic complications: no  Cardiovascular status: acceptable  Respiratory status: acceptable  Hydration status: acceptable  Post anesthesia nausea and vomiting:  none  Final Post Anesthesia Temperature Assessment:  Normothermia (36.0-37.5 degrees C)      INITIAL Post-op Vital signs:   Vitals Value Taken Time   /76 02/26/21 1329   Temp 36.3 °C (97.3 °F) 02/26/21 1329   Pulse 87 02/26/21 1329   Resp 20 02/26/21 1329   SpO2 99 % 02/26/21 1329

## 2021-03-03 NOTE — PROGRESS NOTES
Tell patient that the polyp removed from his colon was benign. He should have a repeat colonoscopy in 2 years.

## 2021-03-09 PROBLEM — G47.30 SLEEP APNEA: Status: ACTIVE | Noted: 2021-03-09

## 2021-03-13 ENCOUNTER — TELEPHONE (OUTPATIENT)
Dept: GASTROENTEROLOGY | Age: 51
End: 2021-03-13

## 2021-03-13 NOTE — TELEPHONE ENCOUNTER
I called patient, after verified , explained that during the colonoscopy , Dr Zoraida Perez removed a polyp, that was completely benign. Will follow up in 2 years. He said ok.

## 2021-03-13 NOTE — TELEPHONE ENCOUNTER
----- Message from Perez Webber MD sent at 3/3/2021  6:23 PM EST -----  Tell patient that the polyp removed from his colon was benign. He should have a repeat colonoscopy in 2 years.

## 2021-03-19 ENCOUNTER — OFFICE VISIT (OUTPATIENT)
Dept: PRIMARY CARE CLINIC | Age: 51
End: 2021-03-19
Payer: COMMERCIAL

## 2021-03-19 VITALS
SYSTOLIC BLOOD PRESSURE: 158 MMHG | WEIGHT: 315 LBS | BODY MASS INDEX: 41.75 KG/M2 | OXYGEN SATURATION: 99 % | HEIGHT: 73 IN | TEMPERATURE: 97.4 F | DIASTOLIC BLOOD PRESSURE: 105 MMHG | HEART RATE: 88 BPM | RESPIRATION RATE: 18 BRPM

## 2021-03-19 DIAGNOSIS — J01.00 ACUTE MAXILLARY SINUSITIS, RECURRENCE NOT SPECIFIED: ICD-10-CM

## 2021-03-19 DIAGNOSIS — I10 ESSENTIAL HYPERTENSION: Primary | ICD-10-CM

## 2021-03-19 DIAGNOSIS — J33.9 NASAL POLYP: ICD-10-CM

## 2021-03-19 PROCEDURE — 99214 OFFICE O/P EST MOD 30 MIN: CPT | Performed by: NURSE PRACTITIONER

## 2021-03-19 RX ORDER — METOPROLOL SUCCINATE 25 MG/1
25 TABLET, EXTENDED RELEASE ORAL DAILY
Qty: 30 TAB | Refills: 2 | Status: SHIPPED | OUTPATIENT
Start: 2021-03-19 | End: 2022-09-16 | Stop reason: SDUPTHER

## 2021-03-19 RX ORDER — DOXYCYCLINE 100 MG/1
100 TABLET ORAL 2 TIMES DAILY
Qty: 20 TAB | Refills: 0 | Status: SHIPPED | OUTPATIENT
Start: 2021-03-19 | End: 2021-03-29

## 2021-03-19 RX ORDER — PREDNISONE 20 MG/1
20 TABLET ORAL
Qty: 5 TAB | Refills: 0 | Status: SHIPPED | OUTPATIENT
Start: 2021-03-19 | End: 2022-09-16 | Stop reason: ALTCHOICE

## 2021-03-19 NOTE — PROGRESS NOTES
1. Have you been to the ER, urgent care clinic since your last visit? Hospitalized since your last visit? No    2. Have you seen or consulted any other health care providers outside of the [address] [place] [address] since your last visit? Include any pap smears or colon screening. No     Chief Complaint   Patient presents with    Follow Up Chronic Condition     Hypertension, /105, patient reports he did not take his medication today and that the new medication you prescribed gives him headaches, he does not know the name of the medication.  Sinus Infection     Patient reports his sinuses are tight causing his face to hurt x 2 days.

## 2021-03-19 NOTE — PROGRESS NOTES
Viry Casas is a 48 y.o. male who presents to the office today for the following:    Chief Complaint   Patient presents with    Follow Up Chronic Condition     Hypertension, /105, patient reports he did not take his medication today and that the new medication you prescribed gives him headaches, he does not know the name of the medication.  Sinus Infection     Patient reports his sinuses are tight causing his face to hurt x 2 days. Past Medical History:   Diagnosis Date    MELBA (acute kidney injury) (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    Hypercholesterolemia     Hypertension     Sleep apnea        Past Surgical History:   Procedure Laterality Date    COLONOSCOPY N/A 2/26/2021    COLONOSCOPY performed by Christiano Ennis MD at [facility]    HX COLONOSCOPY  01/2021        Family History   Problem Relation Age of Onset    Diabetes Mother     Hypertension Mother         Social History     Tobacco Use    Smoking status: Current Every Day Smoker     Packs/day: 0.25     Years: 30.00     Pack years: 7.50     Types: Cigarettes    Smokeless tobacco: Never Used   Substance Use Topics    Alcohol use: No    Drug use: No        HPI  Patient here for follow up of new medication with PMH of hypertension, asthma, tobacco dependence hyperlipidemia,obesity and sleep apnea. States that he started taking the amlodipine but found that this gave him headaches. Stopped taking medication as a result. Is taking his lisinopril/HCTZ but has not had dose today. Is also here with complaint of pain in right sinuses x 1 week. Reports that he frequently has had sinus problems on this side and usually gets infection once or twice per year. Has difficulty breathing out of right nostril. States pain radiates into his teeth now and is not improving with using OTC sinus reliever. No fever, cough or sore throat.      Current Outpatient Medications on File Prior to Visit   Medication Sig    aspirin 81 mg chewable tablet Take 81 mg by mouth daily.  lisinopril-hydroCHLOROthiazide (PRINZIDE, ZESTORETIC) 20-12.5 mg per tablet Take 1 Tab by mouth daily.  [DISCONTINUED] amLODIPine (NORVASC) 5 mg tablet Take 1 Tab by mouth daily. No current facility-administered medications on file prior to visit. Medications Ordered Today   Medications    metoprolol succinate (TOPROL-XL) 25 mg XL tablet     Sig: Take 1 Tab by mouth daily. Dispense:  30 Tab     Refill:  2    predniSONE (DELTASONE) 20 mg tablet     Sig: Take 20 mg by mouth daily (with breakfast). Dispense:  5 Tab     Refill:  0    doxycycline (ADOXA) 100 mg tablet     Sig: Take 1 Tab by mouth two (2) times a day for 10 days. Dispense:  20 Tab     Refill:  0        Review of Systems   Constitutional: Negative. HENT: Positive for congestion, ear pain (right) and sinus pain. Negative for ear discharge, hearing loss, nosebleeds, sore throat and tinnitus. Eyes: Negative. Respiratory: Negative. Cardiovascular: Negative. Gastrointestinal: Negative. Genitourinary: Negative. Musculoskeletal: Negative. Neurological: Positive for headaches. Negative for dizziness, tingling, sensory change, speech change and weakness. Visit Vitals  BP (!) 158/105 (BP 1 Location: Left upper arm, BP Patient Position: Sitting)   Pulse 88   Temp 97.4 °F (36.3 °C) (Tympanic)   Resp 18   Ht 6' 1\" (1.854 m)   Wt (!) 358 lb 6 oz (162.6 kg)   SpO2 99%   BMI 47.28 kg/m²       Physical Exam  Vitals signs and nursing note reviewed. Constitutional:       Appearance: Normal appearance. Cardiovascular:      Rate and Rhythm: Normal rate and regular rhythm. Pulses: Normal pulses. Heart sounds: Normal heart sounds. Pulmonary:      Effort: Pulmonary effort is normal.      Breath sounds: Normal breath sounds.    Abdominal:      General: Bowel sounds are normal. Palpations: Abdomen is soft. Tenderness: There is no abdominal tenderness. Musculoskeletal: Normal range of motion. Right lower leg: No edema. Left lower leg: No edema. Skin:     General: Skin is warm and dry. Neurological:      Mental Status: He is alert and oriented to person, place, and time. Mental status is at baseline. 1. Essential hypertension  Stopped amlodipine as not tolerating  He has not had his lisinopril/HCTZ today but reports readings at home have remain above 677 systolic  Will start on metoprolol 25mg daily to replace the amloipine stopped. Reviewed potential side effects. Continue to check blood pressure at home and call in 1 week with readings  Labs up to date    2. Acute maxillary sinusitis, recurrence not specified  Will treat for sinusitis w/ antibiotic and also add prednisone  Recommended to continue using OTC mucinex to help clear congestion. Supportive care encouraged including rest, increased oral fluids, cool mist humidifier and Tylenol/Motrin prn.     3. Nasal polyp  He declines eval with ENT at this time but wants to consider at later date as he does have trouble breathing out of right nostril. Patient verbalizes understanding of plan of care as discussed above    Follow-up and Dispositions    · Return in about 4 weeks (around 4/16/2021), or if symptoms worsen or fail to improve.

## 2022-03-19 PROBLEM — J45.909 ASTHMA: Status: ACTIVE | Noted: 2020-11-12

## 2022-03-19 PROBLEM — I10 HYPERTENSION: Status: ACTIVE | Noted: 2020-11-12

## 2022-03-19 PROBLEM — F17.200 TOBACCO DEPENDENCE: Status: ACTIVE | Noted: 2021-02-15

## 2022-03-19 PROBLEM — E66.01 OBESITY, MORBID (HCC): Status: ACTIVE | Noted: 2020-11-23

## 2022-03-19 PROBLEM — I51.7 CARDIOMEGALY: Status: ACTIVE | Noted: 2020-11-12

## 2022-03-20 PROBLEM — G47.30 SLEEP APNEA: Status: ACTIVE | Noted: 2021-03-09

## 2022-08-08 ENCOUNTER — HOSPITAL ENCOUNTER (EMERGENCY)
Age: 52
Discharge: HOME | End: 2022-08-08
Attending: EMERGENCY MEDICINE
Payer: COMMERCIAL

## 2022-08-08 ENCOUNTER — APPOINTMENT (OUTPATIENT)
Dept: CT IMAGING | Age: 52
End: 2022-08-08
Attending: EMERGENCY MEDICINE
Payer: COMMERCIAL

## 2022-08-08 VITALS
RESPIRATION RATE: 17 BRPM | OXYGEN SATURATION: 98 % | TEMPERATURE: 97.5 F | SYSTOLIC BLOOD PRESSURE: 145 MMHG | HEART RATE: 88 BPM | DIASTOLIC BLOOD PRESSURE: 65 MMHG

## 2022-08-08 DIAGNOSIS — M54.50 ACUTE MIDLINE LOW BACK PAIN WITHOUT SCIATICA: Primary | ICD-10-CM

## 2022-08-08 PROCEDURE — 74011250636 HC RX REV CODE- 250/636: Performed by: EMERGENCY MEDICINE

## 2022-08-08 PROCEDURE — 99284 EMERGENCY DEPT VISIT MOD MDM: CPT

## 2022-08-08 PROCEDURE — 74011250637 HC RX REV CODE- 250/637: Performed by: EMERGENCY MEDICINE

## 2022-08-08 PROCEDURE — 96372 THER/PROPH/DIAG INJ SC/IM: CPT

## 2022-08-08 PROCEDURE — 72131 CT LUMBAR SPINE W/O DYE: CPT

## 2022-08-08 RX ORDER — OXYCODONE AND ACETAMINOPHEN 5; 325 MG/1; MG/1
2 TABLET ORAL
Status: COMPLETED | OUTPATIENT
Start: 2022-08-08 | End: 2022-08-08

## 2022-08-08 RX ORDER — NIFEDIPINE 10 MG/1
20 CAPSULE ORAL 3 TIMES DAILY
Status: DISCONTINUED | OUTPATIENT
Start: 2022-08-08 | End: 2022-08-08

## 2022-08-08 RX ORDER — IBUPROFEN 800 MG/1
800 TABLET ORAL
Qty: 20 TABLET | Refills: 0 | Status: SHIPPED | OUTPATIENT
Start: 2022-08-08 | End: 2022-08-15

## 2022-08-08 RX ORDER — CYCLOBENZAPRINE HCL 10 MG
10 TABLET ORAL
Qty: 20 TABLET | Refills: 0 | Status: SHIPPED | OUTPATIENT
Start: 2022-08-08

## 2022-08-08 RX ORDER — NIFEDIPINE 10 MG/1
20 CAPSULE ORAL
Status: COMPLETED | OUTPATIENT
Start: 2022-08-08 | End: 2022-08-08

## 2022-08-08 RX ORDER — KETOROLAC TROMETHAMINE 30 MG/ML
60 INJECTION, SOLUTION INTRAMUSCULAR; INTRAVENOUS
Status: COMPLETED | OUTPATIENT
Start: 2022-08-08 | End: 2022-08-08

## 2022-08-08 RX ORDER — CYCLOBENZAPRINE HCL 10 MG
10 TABLET ORAL
Status: COMPLETED | OUTPATIENT
Start: 2022-08-08 | End: 2022-08-08

## 2022-08-08 RX ORDER — CLONIDINE HYDROCHLORIDE 0.1 MG/1
0.2 TABLET ORAL
Status: COMPLETED | OUTPATIENT
Start: 2022-08-08 | End: 2022-08-08

## 2022-08-08 RX ORDER — ACETAMINOPHEN AND CODEINE PHOSPHATE 300; 30 MG/1; MG/1
1 TABLET ORAL
Qty: 18 TABLET | Refills: 0 | Status: SHIPPED | OUTPATIENT
Start: 2022-08-08 | End: 2022-08-11

## 2022-08-08 RX ADMIN — CYCLOBENZAPRINE 10 MG: 10 TABLET, FILM COATED ORAL at 01:36

## 2022-08-08 RX ADMIN — CLONIDINE HYDROCHLORIDE 0.2 MG: 0.1 TABLET ORAL at 02:50

## 2022-08-08 RX ADMIN — NIFEDIPINE 20 MG: 10 CAPSULE ORAL at 01:50

## 2022-08-08 RX ADMIN — KETOROLAC TROMETHAMINE 60 MG: 30 INJECTION, SOLUTION INTRAMUSCULAR at 01:36

## 2022-08-08 RX ADMIN — OXYCODONE AND ACETAMINOPHEN 2 TABLET: 325; 5 TABLET ORAL at 01:36

## 2022-08-08 NOTE — ED TRIAGE NOTES
Pt reporting mid lower back pain since Wednesday when he stepped out of his truck wrong. Last had ibuprofen at 5pm and a lidocaine patch without relief. Pt has not had BP meds in 3 days.

## 2022-08-08 NOTE — ED PROVIDER NOTES
Patient presents with complaint of severe low back pain since Wednesday , when he stepped out of his truck wrong. He reports 10/10 pain, worse with sitting, standing or bending. 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    Hypercholesterolemia     Hypertension     Sleep apnea        Past Surgical History:   Procedure Laterality Date    COLONOSCOPY N/A 2/26/2021    COLONOSCOPY performed by Andre Doe MD at [facility] ENDOSCOPY    HX COLONOSCOPY  01/2021         Family History:   Problem Relation Age of Onset    Diabetes Mother     Hypertension Mother        Social History     Socioeconomic History    Marital status:      Spouse name: Not on file    Number of children: Not on file    Years of education: Not on file    Highest education level: Not on file   Occupational History    Not on file   Tobacco Use    Smoking status: Every Day     Packs/day: 0.25     Years: 30.00     Pack years: 7.50     Types: Cigarettes    Smokeless tobacco: Never   Vaping Use    Vaping Use: Former   Substance and Sexual Activity    Alcohol use: No    Drug use: No    Sexual activity: Not on file   Other Topics Concern    Not on file   Social History Narrative    Not on file     Social Determinants of Health     Financial Resource Strain: Not on file   Food Insecurity: Not on file   Transportation Needs: Not on file   Physical Activity: Not on file   Stress: Not on file   Social Connections: Not on file   Intimate Partner Violence: Not on file   Housing Stability: Not on file         ALLERGIES: Pcn [penicillins] and Bee sting [sting, bee]    Review of Systems   Constitutional: Negative. HENT: Negative. Eyes: Negative. Respiratory: Negative. Cardiovascular: Negative. Endocrine: Negative. Genitourinary: Negative.     Musculoskeletal:  Positive for back pain and gait problem. Skin: Negative. Allergic/Immunologic: Negative. Hematological: Negative. Psychiatric/Behavioral: Negative. There were no vitals filed for this visit. Physical Exam  Vitals and nursing note reviewed. HENT:      Head: Normocephalic and atraumatic. Cardiovascular:      Rate and Rhythm: Normal rate and regular rhythm. Pulses: Normal pulses. Heart sounds: Normal heart sounds. Pulmonary:      Effort: Pulmonary effort is normal.      Breath sounds: Normal breath sounds. Abdominal:      General: Abdomen is flat. Bowel sounds are normal.      Palpations: Abdomen is soft. Musculoskeletal:         General: Tenderness present. Normal range of motion. Cervical back: Normal range of motion and neck supple. Comments: Lower  with decreased ROM. Skin:     General: Skin is warm and dry. Neurological:      General: No focal deficit present. Mental Status: He is oriented to person, place, and time. Mental status is at baseline. Cranial Nerves: No cranial nerve deficit. Motor: No weakness.    Psychiatric:         Mood and Affect: Mood normal.         Behavior: Behavior normal.        MDM         Procedures

## 2022-08-08 NOTE — Clinical Note
[address] [facility] EMERGENCY DEPT  [place] [phone]  [phone]    Work/School Note    Date: 8/8/2022    To Whom It May concern:    Ina Boyd was seen and treated today in the emergency room by the following provider(s):  Attending Provider: Mukesh White MD.      Ina Boyd. is excused from work/school on 08/08/22 and 08/09/22. He is medically clear to return to work/school on 8/10/2022.        Sincerely,          Dallas Harvey

## 2022-08-08 NOTE — Clinical Note
[address]  [facility] EMERGENCY DEPT  [place] [phone]  [phone]    Work/School Note    Date: 8/8/2022    To Whom It May concern:    Jamari Leger was seen and treated today in the emergency room by the following provider(s):  Attending Provider: Jose Ospina MD.      Jamari Leger. is excused from work/school on 08/08/22 and 08/09/22. He is medically clear to return to work/school on 8/10/2022.        Sincerely,          Maria Guadalupe Mcallister MD

## 2022-09-16 ENCOUNTER — OFFICE VISIT (OUTPATIENT)
Dept: PRIMARY CARE CLINIC | Age: 52
End: 2022-09-16
Payer: COMMERCIAL

## 2022-09-16 VITALS
SYSTOLIC BLOOD PRESSURE: 160 MMHG | WEIGHT: 315 LBS | BODY MASS INDEX: 41.75 KG/M2 | DIASTOLIC BLOOD PRESSURE: 88 MMHG | TEMPERATURE: 97.4 F | RESPIRATION RATE: 18 BRPM | OXYGEN SATURATION: 97 % | HEART RATE: 81 BPM | HEIGHT: 73 IN

## 2022-09-16 DIAGNOSIS — E66.01 OBESITY, MORBID (HCC): ICD-10-CM

## 2022-09-16 DIAGNOSIS — I51.7 MODERATE CONCENTRIC LEFT VENTRICULAR HYPERTROPHY: ICD-10-CM

## 2022-09-16 DIAGNOSIS — M54.41 ACUTE RIGHT-SIDED LOW BACK PAIN WITH RIGHT-SIDED SCIATICA: ICD-10-CM

## 2022-09-16 DIAGNOSIS — I10 ESSENTIAL HYPERTENSION: Primary | ICD-10-CM

## 2022-09-16 DIAGNOSIS — F17.200 TOBACCO DEPENDENCE: ICD-10-CM

## 2022-09-16 PROCEDURE — 99214 OFFICE O/P EST MOD 30 MIN: CPT | Performed by: NURSE PRACTITIONER

## 2022-09-16 RX ORDER — NIFEDIPINE 30 MG/1
30 TABLET, FILM COATED, EXTENDED RELEASE ORAL DAILY
Qty: 90 TABLET | Refills: 2 | Status: SHIPPED | OUTPATIENT
Start: 2022-09-16 | End: 2022-12-15

## 2022-09-16 RX ORDER — METOPROLOL SUCCINATE 25 MG/1
25 TABLET, EXTENDED RELEASE ORAL DAILY
Qty: 90 TABLET | Refills: 2 | Status: SHIPPED | OUTPATIENT
Start: 2022-09-16

## 2022-09-16 RX ORDER — LISINOPRIL AND HYDROCHLOROTHIAZIDE 12.5; 2 MG/1; MG/1
1 TABLET ORAL DAILY
Qty: 90 TABLET | Refills: 2 | Status: SHIPPED | OUTPATIENT
Start: 2022-09-16

## 2022-09-16 NOTE — PROGRESS NOTES
Jalen Nayak is a 46 y.o. 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    Hypercholesterolemia     Hypertension     Sleep apnea        Past Surgical History:   Procedure Laterality Date    COLONOSCOPY N/A 2/26/2021    COLONOSCOPY performed by Thania Hernandez MD at [facility] ENDOSCOPY    HX COLONOSCOPY  01/2021        Family History   Problem Relation Age of Onset    Diabetes Mother     Hypertension Mother         Social History     Tobacco Use    Smoking status: Every Day     Packs/day: 0.25     Years: 30.00     Pack years: 7.50     Types: Cigarettes    Smokeless tobacco: Never   Vaping Use    Vaping Use: Former   Substance Use Topics    Alcohol use: No    Drug use: No        HPI  Patient here today for follow up of chronic conditions with PMH of Obesity, asthma , moderate LVH and tobacco dependence. Has been taking blood pressure medicines as directed. Reports he was seen recently at urgent care about 1 mo ago due to low back pain that was radiating into right leg. Is still having some pain off and on but this is much improved. No numbness, weakness or bowel/bladder changes associated. Wants to discussed elevated blood pressures. Is checking at home and still staying above 873 systolic despite taking medicine. Does report having some increased stress that could contribute. No associated chest pain, shortness of breath or other symptoms. Current Outpatient Medications on File Prior to Visit   Medication Sig    cyclobenzaprine (FLEXERIL) 10 mg tablet Take 1 Tablet by mouth three (3) times daily as needed for Muscle Spasm(s). [DISCONTINUED] metoprolol succinate (TOPROL-XL) 25 mg XL tablet Take 1 Tab by mouth daily.     Edwige Stark predniSONE (DELTASONE) 20 mg tablet Take 20 mg by mouth daily (with breakfast). aspirin 81 mg chewable tablet Take 81 mg by mouth daily. [DISCONTINUED] lisinopril-hydroCHLOROthiazide (PRINZIDE, ZESTORETIC) 20-12.5 mg per tablet Take 1 Tab by mouth daily. No current facility-administered medications on file prior to visit. Medications Ordered Today   Medications    NIFEdipine ER (ADALAT CC) 30 mg ER tablet     Sig: Take 1 Tablet by mouth daily for 90 days. Dispense:  90 Tablet     Refill:  2    lisinopril-hydroCHLOROthiazide (PRINZIDE, ZESTORETIC) 20-12.5 mg per tablet     Sig: Take 1 Tablet by mouth daily. Dispense:  90 Tablet     Refill:  2    metoprolol succinate (TOPROL-XL) 25 mg XL tablet     Sig: Take 1 Tablet by mouth daily. Dispense:  90 Tablet     Refill:  2        Review of Systems   Constitutional: Negative. HENT: Negative. Respiratory: Negative. Cardiovascular: Negative. Gastrointestinal: Negative. Genitourinary: Negative. Musculoskeletal:  Positive for back pain and myalgias. Negative for falls, joint pain and neck pain. Skin: Negative. Neurological: Negative. Psychiatric/Behavioral: Negative. Visit Vitals  BP (!) 160/88 (BP 1 Location: Left upper arm, BP Patient Position: Sitting, BP Cuff Size: Large adult)   Pulse 81   Temp 97.4 °F (36.3 °C) (Temporal)   Resp 18   Ht 6' 1\" (1.854 m)   Wt (!) 355 lb 6.4 oz (161.2 kg)   SpO2 97%   BMI 46.89 kg/m²       Physical Exam  Vitals and nursing note reviewed. Constitutional:       Appearance: Normal appearance. He is obese. HENT:      Right Ear: Tympanic membrane normal.      Left Ear: Tympanic membrane normal.      Mouth/Throat:      Mouth: Mucous membranes are moist.      Pharynx: Oropharynx is clear. Eyes:      Pupils: Pupils are equal, round, and reactive to light. Neck:      Vascular: No carotid bruit. Cardiovascular:      Rate and Rhythm: Normal rate and regular rhythm.       Pulses: Normal pulses. Heart sounds: Normal heart sounds. Pulmonary:      Effort: Pulmonary effort is normal.      Breath sounds: Normal breath sounds. Abdominal:      General: Bowel sounds are normal.      Palpations: Abdomen is soft. Tenderness: There is no abdominal tenderness. There is no guarding. Musculoskeletal:         General: Normal range of motion. Right lower leg: No edema. Left lower leg: No edema. Lymphadenopathy:      Cervical: No cervical adenopathy. Skin:     General: Skin is warm and dry. Neurological:      Mental Status: He is alert and oriented to person, place, and time. Mental status is at baseline. Psychiatric:         Mood and Affect: Mood normal.         Behavior: Behavior normal.          1. Essential hypertension  Blood pressure is not and going to add nifedipine 30mg CR daily   Reviewed side effects  He will complete labs when he returns in 4 weeks  Encourage to monitor at home and bring to next appointment  - NIFEdipine ER (ADALAT CC) 30 mg ER tablet; Take 1 Tablet by mouth daily for 90 days. Dispense: 90 Tablet; Refill: 2  - lisinopril-hydroCHLOROthiazide (PRINZIDE, ZESTORETIC) 20-12.5 mg per tablet; Take 1 Tablet by mouth daily. Dispense: 90 Tablet; Refill: 2  - metoprolol succinate (TOPROL-XL) 25 mg XL tablet; Take 1 Tablet by mouth daily. Dispense: 90 Tablet; Refill: 2    2. Tobacco dependence  Have counseled on dangers of continued smoking and cessation is encouraged    3. Obesity, morbid ([address]  Continue to encourage weight loss with decreasing excess fat, salt and sugar in diet along with getting regular exercise 3-5 times weekly for 30-45 minutes consistently. 4. Acute right-sided low back pain with right-sided sciatica  CT Results (most recent):  Results from Hospital Encounter encounter on 08/08/22    CT SPINE LUMB WO CONT    Narrative  EXAM:  CT SPINE LUMB WO CONT    INDICATION: Low back pain after injury    COMPARISON: None.     TECHNIQUE: Helical noncontrast CT of the lumbar spine with coronal and sagittal  reformats. Images were reviewed in the bone and soft tissue windows. CT dose  reduction was achieved through use of a standardized protocol tailored for this  examination and automatic exposure control for dose modulation. FINDINGS:  There is no acute fracture or subluxation. Vertebral body heights and  intervertebral disc spaces are maintained. There is diffuse lumbar facet  arthrosis. There is no abnormality in alignment. There is no spinal canal  stenosis. There is mild foraminal stenosis bilaterally at L4-5 and on the right  at L5-S1. The paraspinal soft tissues are unremarkable. Impression  No acute abnormality. Diffuse lumbar facet degenerative changes with mild  foraminal stenosis bilaterally at L4-5 and on the right at L5-S1. He reports his pain is much improved  Continue home exercises and tylenol prn  Encourage proper lifting techniques  If develops worsening symptoms, encouraged follow up    5. Moderate concentric left ventricular hypertrophy  Continue working on risk control with managing blood pressure and weight  He is following with [facility]     We discussed the expected course, resolution and complications of the diagnosis(es) in detail. Medication risks, benefits, costs, interactions, and alternatives were discussed as indicated. I advised him to contact the office if his condition worsens, changes or fails to improve as anticipated. He expressed understanding with the diagnosis(es) and plan. Follow-up and Dispositions    Return in about 4 weeks (around 10/14/2022), or 8 am appointment - needs labs that day.

## 2022-09-16 NOTE — PROGRESS NOTES
Chief Complaint   Patient presents with    ED Follow-up    Back Pain     1. Have you been to the ER, urgent care clinic since your last visit? Hospitalized since your last visit? No    2. Have you seen or consulted any other health care providers outside of the [address] [place] [address] since your last visit? Include any pap smears or colon screening.  No

## 2022-12-15 ENCOUNTER — OFFICE VISIT (OUTPATIENT)
Dept: PRIMARY CARE CLINIC | Age: 52
End: 2022-12-15

## 2022-12-15 VITALS
DIASTOLIC BLOOD PRESSURE: 85 MMHG | SYSTOLIC BLOOD PRESSURE: 117 MMHG | WEIGHT: 315 LBS | BODY MASS INDEX: 41.75 KG/M2 | TEMPERATURE: 97.9 F | RESPIRATION RATE: 18 BRPM | HEIGHT: 73 IN | OXYGEN SATURATION: 98 % | HEART RATE: 84 BPM

## 2022-12-15 DIAGNOSIS — F17.200 TOBACCO DEPENDENCE: ICD-10-CM

## 2022-12-15 DIAGNOSIS — I51.7 MODERATE CONCENTRIC LEFT VENTRICULAR HYPERTROPHY: ICD-10-CM

## 2022-12-15 DIAGNOSIS — E66.01 OBESITY, MORBID (HCC): ICD-10-CM

## 2022-12-15 DIAGNOSIS — I10 ESSENTIAL HYPERTENSION: Primary | ICD-10-CM

## 2022-12-15 DIAGNOSIS — M51.36 DDD (DEGENERATIVE DISC DISEASE), LUMBAR: ICD-10-CM

## 2022-12-15 DIAGNOSIS — K42.9 UMBILICAL HERNIA WITHOUT OBSTRUCTION AND WITHOUT GANGRENE: ICD-10-CM

## 2022-12-15 RX ORDER — NIFEDIPINE 10 MG/1
10 CAPSULE ORAL 2 TIMES DAILY
Qty: 180 CAPSULE | Refills: 1 | Status: SHIPPED | OUTPATIENT
Start: 2022-12-15

## 2022-12-15 NOTE — PROGRESS NOTES
Noemí Barboza is a 46 y.o. 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    Hypercholesterolemia     Hypertension     Sleep apnea        Past Surgical History:   Procedure Laterality Date    COLONOSCOPY N/A 2/26/2021    COLONOSCOPY performed by Marcial Moss MD at [facility] ENDOSCOPY    HX COLONOSCOPY  01/2021        Family History   Problem Relation Age of Onset    Diabetes Mother     Hypertension Mother         Social History     Tobacco Use    Smoking status: Every Day     Packs/day: 0.25     Years: 30.00     Pack years: 7.50     Types: Cigarettes    Smokeless tobacco: Never   Vaping Use    Vaping Use: Former   Substance Use Topics    Alcohol use: No    Drug use: No        HPI  Patient here today for follow up of chronic conditions with PMH of Obesity, asthma , moderate LVH and tobacco dependence. Has been taking blood pressure medicines as directed. Denies any new concerns today. Current Outpatient Medications on File Prior to Visit   Medication Sig    lisinopril-hydroCHLOROthiazide (PRINZIDE, ZESTORETIC) 20-12.5 mg per tablet Take 1 Tablet by mouth daily. metoprolol succinate (TOPROL-XL) 25 mg XL tablet Take 1 Tablet by mouth daily. cyclobenzaprine (FLEXERIL) 10 mg tablet Take 1 Tablet by mouth three (3) times daily as needed for Muscle Spasm(s). aspirin 81 mg chewable tablet Take 81 mg by mouth daily. No current facility-administered medications on file prior to visit. Medications Ordered Today   Medications    NIFEdipine (PROCARDIA) 10 mg capsule     Sig: Take 1 Capsule by mouth two (2) times a day. Dispense:  180 Capsule     Refill:  1          Review of Systems   Constitutional: Negative. HENT: Negative. Respiratory: Negative. Cardiovascular: Negative. Gastrointestinal: Negative. Genitourinary: Negative. Musculoskeletal:  Positive for back pain and myalgias. Negative for falls, joint pain and neck pain. Skin: Negative. Neurological: Negative. Psychiatric/Behavioral: Negative. Visit Vitals  /85 (BP 1 Location: Left upper arm, BP Patient Position: Sitting, BP Cuff Size: Large adult)   Pulse 84   Temp 97.9 °F (36.6 °C) (Temporal)   Resp 18   Ht 6' 1\" (1.854 m)   Wt (!) 353 lb (160.1 kg)   SpO2 98%   BMI 46.57 kg/m²       Physical Exam  Vitals and nursing note reviewed. Constitutional:       Appearance: Normal appearance. He is obese. HENT:      Right Ear: Tympanic membrane normal.      Left Ear: Tympanic membrane normal.      Mouth/Throat:      Mouth: Mucous membranes are moist.      Pharynx: Oropharynx is clear. Eyes:      Pupils: Pupils are equal, round, and reactive to light. Neck:      Vascular: No carotid bruit. Cardiovascular:      Rate and Rhythm: Normal rate and regular rhythm. Pulses: Normal pulses. Heart sounds: Normal heart sounds. Pulmonary:      Effort: Pulmonary effort is normal.      Breath sounds: Normal breath sounds. Abdominal:      General: Bowel sounds are normal.      Palpations: Abdomen is soft. Tenderness: There is no abdominal tenderness. There is no guarding. Hernia: A hernia (non-tender umbilical) is present. Musculoskeletal:         General: Normal range of motion. Right lower leg: No edema. Left lower leg: No edema. Lymphadenopathy:      Cervical: No cervical adenopathy. Skin:     General: Skin is warm and dry. Neurological:      Mental Status: He is alert and oriented to person, place, and time. Mental status is at baseline.    Psychiatric:         Mood and Affect: Mood normal.         Behavior: Behavior normal.          1. Essential hypertension  Blood pressure is at goal today and at home  Has been cutting his nifedipine due to headache at higher dose so going to change to IR as we discussed XR should not be cut. Encourage to monitor at home and bring to next appointment. Return if staying above 149/90  - CBC WITH AUTOMATED DIFF  - METABOLIC PANEL, COMPREHENSIVE  - URINALYSIS W/ RFLX MICROSCOPIC  - LIPID PANEL  - NIFEdipine (PROCARDIA) 10 mg capsule; Take 1 Capsule by mouth two (2) times a day. Dispense: 180 Capsule; Refill: 1    2. Tobacco dependence  Have counseled on dangers of continued smoking and cessation is encouraged    3. Obesity, morbid ([address]  Continue to encourage weight loss with decreasing excess fat, salt and sugar in diet along with getting regular exercise 3-5 times weekly for 30-45 minutes consistently. 4. DDD(Degenerative disc disease), lumbar  Stable   Continue home exercises and tylenol prn  Encourage proper lifting techniques  If develops worsening symptoms, encouraged follow up    5. Moderate concentric left ventricular hypertrophy  Continue working on risk control with managing blood pressure and weight  [facility]     6. Umbilical hernia without obstruction and without gangrene  Non-tender and easily reducible  He declines to have further intervention unless pain or other problem develops      We discussed the expected course, resolution and complications of the diagnosis(es) in detail. Medication risks, benefits, costs, interactions, and alternatives were discussed as indicated. I advised him to contact the office if his condition worsens, changes or fails to improve as anticipated. He expressed understanding with the diagnosis(es) and plan. Follow-up and Dispositions    Return in about 3 months (around 3/15/2023) for or sooner for worsening symptoms.

## 2022-12-16 PROBLEM — M51.369 DDD (DEGENERATIVE DISC DISEASE), LUMBAR: Status: ACTIVE | Noted: 2022-12-16

## 2022-12-16 PROBLEM — M51.36 DDD (DEGENERATIVE DISC DISEASE), LUMBAR: Status: ACTIVE | Noted: 2022-12-16

## 2022-12-16 PROBLEM — K42.9 UMBILICAL HERNIA WITHOUT OBSTRUCTION AND WITHOUT GANGRENE: Status: ACTIVE | Noted: 2022-12-16

## 2022-12-16 PROBLEM — I51.7 MODERATE CONCENTRIC LEFT VENTRICULAR HYPERTROPHY: Status: ACTIVE | Noted: 2020-11-12

## 2023-03-10 ENCOUNTER — OFFICE VISIT (OUTPATIENT)
Dept: PRIMARY CARE CLINIC | Age: 53
End: 2023-03-10

## 2023-03-10 VITALS
DIASTOLIC BLOOD PRESSURE: 90 MMHG | OXYGEN SATURATION: 96 % | BODY MASS INDEX: 41.75 KG/M2 | TEMPERATURE: 97.3 F | RESPIRATION RATE: 20 BRPM | WEIGHT: 315 LBS | SYSTOLIC BLOOD PRESSURE: 141 MMHG | HEART RATE: 84 BPM | HEIGHT: 73 IN

## 2023-03-10 DIAGNOSIS — I51.7 MODERATE CONCENTRIC LEFT VENTRICULAR HYPERTROPHY: ICD-10-CM

## 2023-03-10 DIAGNOSIS — E66.01 OBESITY, MORBID (HCC): ICD-10-CM

## 2023-03-10 DIAGNOSIS — K42.9 UMBILICAL HERNIA WITHOUT OBSTRUCTION AND WITHOUT GANGRENE: ICD-10-CM

## 2023-03-10 DIAGNOSIS — M51.36 DDD (DEGENERATIVE DISC DISEASE), LUMBAR: ICD-10-CM

## 2023-03-10 DIAGNOSIS — I10 ESSENTIAL HYPERTENSION: Primary | ICD-10-CM

## 2023-03-10 DIAGNOSIS — F17.200 TOBACCO DEPENDENCE: ICD-10-CM

## 2023-03-10 DIAGNOSIS — J30.89 ENVIRONMENTAL AND SEASONAL ALLERGIES: ICD-10-CM

## 2023-03-10 DIAGNOSIS — Z12.5 SCREENING FOR PROSTATE CANCER: ICD-10-CM

## 2023-03-10 PROBLEM — G89.29 OTHER CHRONIC PAIN: Status: ACTIVE | Noted: 2023-03-10

## 2023-03-10 RX ORDER — BENZONATATE 200 MG/1
200 CAPSULE ORAL
Qty: 15 CAPSULE | Refills: 0 | Status: SHIPPED | OUTPATIENT
Start: 2023-03-10 | End: 2023-03-10 | Stop reason: ALTCHOICE

## 2023-03-10 RX ORDER — METOPROLOL SUCCINATE 50 MG/1
50 TABLET, EXTENDED RELEASE ORAL DAILY
Qty: 90 TABLET | Refills: 1 | Status: SHIPPED | OUTPATIENT
Start: 2023-03-10

## 2023-03-10 RX ORDER — PREDNISONE 20 MG/1
TABLET ORAL
Qty: 9 TABLET | Refills: 0 | Status: SHIPPED | OUTPATIENT
Start: 2023-03-10 | End: 2023-03-10 | Stop reason: ALTCHOICE

## 2023-03-10 RX ORDER — CETIRIZINE HYDROCHLORIDE 10 MG/1
10 TABLET ORAL DAILY
Qty: 30 TABLET | Refills: 2 | Status: SHIPPED | OUTPATIENT
Start: 2023-03-10

## 2023-03-10 RX ORDER — DEXAMETHASONE SODIUM PHOSPHATE 4 MG/ML
8 INJECTION, SOLUTION INTRA-ARTICULAR; INTRALESIONAL; INTRAMUSCULAR; INTRAVENOUS; SOFT TISSUE ONCE
Status: COMPLETED | OUTPATIENT
Start: 2023-03-10 | End: 2023-03-10

## 2023-03-10 RX ORDER — DOXYCYCLINE 100 MG/1
100 CAPSULE ORAL 2 TIMES DAILY
Qty: 20 CAPSULE | Refills: 0 | Status: SHIPPED | OUTPATIENT
Start: 2023-03-10 | End: 2023-03-10 | Stop reason: ALTCHOICE

## 2023-03-10 RX ADMIN — DEXAMETHASONE SODIUM PHOSPHATE 8 MG: 4 INJECTION, SOLUTION INTRA-ARTICULAR; INTRALESIONAL; INTRAMUSCULAR; INTRAVENOUS; SOFT TISSUE at 09:54

## 2023-03-10 NOTE — PROGRESS NOTES
Alva Blue is a 46 y.o. 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    Hypercholesterolemia     Hypertension     Sleep apnea        Past Surgical History:   Procedure Laterality Date    COLONOSCOPY N/A 2/26/2021    COLONOSCOPY performed by González Johnson MD at [facility] ENDOSCOPY    HX COLONOSCOPY  01/2021        Family History   Problem Relation Age of Onset    Diabetes Mother     Hypertension Mother         Social History     Tobacco Use    Smoking status: Every Day     Packs/day: 0.25     Years: 30.00     Pack years: 7.50     Types: Cigarettes    Smokeless tobacco: Never   Vaping Use    Vaping Use: Former   Substance Use Topics    Alcohol use: No    Drug use: No        HPI  Patient here today for follow up of chronic conditions with PMH of Obesity, asthma , allergies, moderate LVH and tobacco dependence. Has been taking blood pressure medicines as directed. Does report itchy eyes and skin along with some clear drainage. States that he usually has difficulty with pollen this time of year. Has been using visine drops for itchy eyes but still rubbing them frequently. No changes in vision however or eye pain. Current Outpatient Medications on File Prior to Visit   Medication Sig    NIFEdipine (PROCARDIA) 10 mg capsule Take 1 Capsule by mouth two (2) times a day. lisinopril-hydroCHLOROthiazide (PRINZIDE, ZESTORETIC) 20-12.5 mg per tablet Take 1 Tablet by mouth daily. [DISCONTINUED] metoprolol succinate (TOPROL-XL) 25 mg XL tablet Take 1 Tablet by mouth daily. [DISCONTINUED] cyclobenzaprine (FLEXERIL) 10 mg tablet Take 1 Tablet by mouth three (3) times daily as needed for Muscle Spasm(s).  (Patient not taking: Reported on 3/10/2023)    aspirin 81 mg chewable tablet Take 81 mg by mouth daily. (Patient not taking: Reported on 3/10/2023)     No current facility-administered medications on file prior to visit. Review of Systems   Constitutional: Negative. HENT:  Positive for congestion. Negative for ear pain, sinus pain and sore throat. Eyes:  Positive for redness. Negative for blurred vision, double vision, photophobia, pain and discharge. Eye itchiness   Respiratory: Negative. Cardiovascular: Negative. Gastrointestinal: Negative. Genitourinary: Negative. Musculoskeletal:  Positive for back pain and myalgias. Negative for falls, joint pain and neck pain. Skin: Negative. Neurological: Negative. Psychiatric/Behavioral: Negative. Visit Vitals  BP (!) 141/90   Pulse 84   Temp 97.3 °F (36.3 °C) (Temporal)   Resp 20   Ht 6' 1\" (1.854 m)   Wt (!) 351 lb (159.2 kg)   SpO2 96%   BMI 46.31 kg/m²       Physical Exam  Vitals and nursing note reviewed. Constitutional:       Appearance: Normal appearance. He is obese. HENT:      Right Ear: Tympanic membrane normal.      Left Ear: Tympanic membrane normal.      Nose: Congestion present. Mouth/Throat:      Mouth: Mucous membranes are moist.      Pharynx: Oropharynx is clear. Eyes:      General: Vision grossly intact. Conjunctiva/sclera:      Right eye: Right conjunctiva is injected. No exudate or hemorrhage. Left eye: Left conjunctiva is injected. No exudate or hemorrhage. Pupils: Pupils are equal, round, and reactive to light. Neck:      Vascular: No carotid bruit. Cardiovascular:      Rate and Rhythm: Normal rate and regular rhythm. Pulses: Normal pulses. Heart sounds: Normal heart sounds. Pulmonary:      Effort: Pulmonary effort is normal.      Breath sounds: Normal breath sounds. Abdominal:      General: Bowel sounds are normal.      Palpations: Abdomen is soft. Tenderness: There is no abdominal tenderness.  There is no guarding. Hernia: A hernia (non-tender umbilical) is present. Musculoskeletal:         General: Normal range of motion. Right lower leg: No edema. Left lower leg: No edema. Lymphadenopathy:      Cervical: No cervical adenopathy. Skin:     General: Skin is warm and dry. Neurological:      Mental Status: He is alert and oriented to person, place, and time. Mental status is at baseline. Psychiatric:         Mood and Affect: Mood normal.         Behavior: Behavior normal.          1. Essential hypertension  Blood pressure is not at goal today  Not tolerant of higher dose with the nifedipine but doing ok with the 10mg twice daily IR  Given blood pressure at home has been slightly above goal, going to increase metoprolol to 50mg XR daily  Encourage to monitor at home and scheduling nurse call in 2-3 weeks for home readings. - CBC WITH AUTOMATED DIFF  - METABOLIC PANEL, COMPREHENSIVE  - URINALYSIS W/ RFLX MICROSCOPIC  - LIPID PANEL  - NIFEdipine (PROCARDIA) 10 mg capsule; Take 1 Capsule by mouth two (2) times a day. Dispense: 180 Capsule; Refill: 1    2. Tobacco dependence  Have counseled on dangers of continued smoking and cessation is encouraged    3. Obesity, morbid ([address]  Decreased by 2lbs  Continue to encourage weight loss with decreasing excess fat, salt and sugar in diet along with getting regular exercise 3-5 times weekly for 30-45 minutes consistently. 4. DDD(Degenerative disc disease), lumbar  Stable   Continue home exercises and tylenol prn  Encourage proper lifting techniques  If develops worsening symptoms, encouraged follow up    5. Moderate concentric left ventricular hypertrophy  Continue working on risk control with managing blood pressure and weight  [facility]     6. Umbilical hernia without obstruction and without gangrene  Non-tender and easily reducible  He declines to have further intervention unless pain or other problem develops    7. Environmental and seasonal allergies  Will give IM decadron 8mg x 1 dose in office  Recommend zyrtec 10mg daily during season change  He also feels visine clear eye drops help reduce itching with eyes and will continue   Advised if develops worsening or persistent symptoms, follow up with provider.  - dexamethasone (DECADRON) 4 mg/mL injection 8 mg    8. Screening for prostate cancer  Check PSA with labs  No current urinary symptoms or hx of prostate cancer       We discussed the expected course, resolution and complications of the diagnosis(es) in detail. Medication risks, benefits, costs, interactions, and alternatives were discussed as indicated. I advised him to contact the office if his condition worsens, changes or fails to improve as anticipated. He expressed understanding with the diagnosis(es) and plan. Follow-up and Dispositions    Return in about 6 months (around 9/10/2023), or 2-3 weeks nurse call for home blood pressure readings, for or sooner for worsening symptoms.

## 2023-03-10 NOTE — PROGRESS NOTES
Chief Complaint   Patient presents with    Hypertension     Follow up. Pt did not bring meds. Pt just took his meds before he came    1. \"Have you been to the ER, urgent care clinic since your last visit? Hospitalized since your last visit? \" No    2. \"Have you seen or consulted any other health care providers outside of the [address] [place] [address] since your last visit? \" No     3. For patients aged 39-70: Has the patient had a colonoscopy / FIT/ Cologuard? No      If the patient is female:    4. For patients aged 41-77: Has the patient had a mammogram within the past 2 years? NA - based on age or sex      11. For patients aged 21-65: Has the patient had a pap smear?  NA - based on age or sex

## 2023-03-11 LAB
ALBUMIN SERPL-MCNC: 4.4 G/DL (ref 3.8–4.9)
ALBUMIN/GLOB SERPL: 1.5 {RATIO} (ref 1.2–2.2)
ALP SERPL-CCNC: 83 IU/L (ref 44–121)
ALT SERPL-CCNC: 11 IU/L (ref 0–44)
APPEARANCE UR: CLEAR
AST SERPL-CCNC: 13 IU/L (ref 0–40)
BACTERIA #/AREA URNS HPF: NORMAL /[HPF]
BASOPHILS # BLD AUTO: 0 X10E3/UL (ref 0–0.2)
BASOPHILS NFR BLD AUTO: 0 %
BILIRUB SERPL-MCNC: 0.5 MG/DL (ref 0–1.2)
BILIRUB UR QL STRIP: NEGATIVE
BUN SERPL-MCNC: 17 MG/DL (ref 6–24)
BUN/CREAT SERPL: 13 (ref 9–20)
CALCIUM SERPL-MCNC: 9.8 MG/DL (ref 8.7–10.2)
CASTS URNS QL MICRO: NORMAL /LPF
CHLORIDE SERPL-SCNC: 99 MMOL/L (ref 96–106)
CHOLEST SERPL-MCNC: 219 MG/DL (ref 100–199)
CO2 SERPL-SCNC: 25 MMOL/L (ref 20–29)
COLOR UR: YELLOW
CREAT SERPL-MCNC: 1.27 MG/DL (ref 0.76–1.27)
EGFRCR SERPLBLD CKD-EPI 2021: 68 ML/MIN/1.73
EOSINOPHIL # BLD AUTO: 0.1 X10E3/UL (ref 0–0.4)
EOSINOPHIL NFR BLD AUTO: 2 %
EPI CELLS #/AREA URNS HPF: NORMAL /HPF (ref 0–10)
ERYTHROCYTE [DISTWIDTH] IN BLOOD BY AUTOMATED COUNT: 13.6 % (ref 11.6–15.4)
GLOBULIN SER CALC-MCNC: 3 G/DL (ref 1.5–4.5)
GLUCOSE SERPL-MCNC: 76 MG/DL (ref 70–99)
GLUCOSE UR QL STRIP: NEGATIVE
HCT VFR BLD AUTO: 46.4 % (ref 37.5–51)
HDLC SERPL-MCNC: 38 MG/DL
HGB BLD-MCNC: 15.8 G/DL (ref 13–17.7)
HGB UR QL STRIP: ABNORMAL
IMM GRANULOCYTES # BLD AUTO: 0 X10E3/UL (ref 0–0.1)
IMM GRANULOCYTES NFR BLD AUTO: 0 %
KETONES UR QL STRIP: NEGATIVE
LDLC SERPL CALC-MCNC: 149 MG/DL (ref 0–99)
LEUKOCYTE ESTERASE UR QL STRIP: NEGATIVE
LYMPHOCYTES # BLD AUTO: 1.8 X10E3/UL (ref 0.7–3.1)
LYMPHOCYTES NFR BLD AUTO: 27 %
MCH RBC QN AUTO: 29.5 PG (ref 26.6–33)
MCHC RBC AUTO-ENTMCNC: 34.1 G/DL (ref 31.5–35.7)
MCV RBC AUTO: 87 FL (ref 79–97)
MICRO URNS: ABNORMAL
MONOCYTES # BLD AUTO: 0.2 X10E3/UL (ref 0.1–0.9)
MONOCYTES NFR BLD AUTO: 3 %
NEUTROPHILS # BLD AUTO: 4.6 X10E3/UL (ref 1.4–7)
NEUTROPHILS NFR BLD AUTO: 68 %
NITRITE UR QL STRIP: NEGATIVE
PH UR STRIP: 5.5 [PH] (ref 5–7.5)
PLATELET # BLD AUTO: 278 X10E3/UL (ref 150–450)
POTASSIUM SERPL-SCNC: 4.9 MMOL/L (ref 3.5–5.2)
PROT SERPL-MCNC: 7.4 G/DL (ref 6–8.5)
PROT UR QL STRIP: NEGATIVE
RBC # BLD AUTO: 5.35 X10E6/UL (ref 4.14–5.8)
RBC #/AREA URNS HPF: NORMAL /HPF (ref 0–2)
SODIUM SERPL-SCNC: 140 MMOL/L (ref 134–144)
SP GR UR STRIP: 1.02 (ref 1–1.03)
TRIGL SERPL-MCNC: 178 MG/DL (ref 0–149)
TSH SERPL DL<=0.005 MIU/L-ACNC: 0.82 UIU/ML (ref 0.45–4.5)
UROBILINOGEN UR STRIP-MCNC: 0.2 MG/DL (ref 0.2–1)
VLDLC SERPL CALC-MCNC: 32 MG/DL (ref 5–40)
WBC # BLD AUTO: 6.7 X10E3/UL (ref 3.4–10.8)
WBC #/AREA URNS HPF: NORMAL /HPF (ref 0–5)

## 2023-03-24 ENCOUNTER — TELEPHONE (OUTPATIENT)
Dept: PRIMARY CARE CLINIC | Age: 53
End: 2023-03-24

## 2023-03-28 ENCOUNTER — TELEPHONE (OUTPATIENT)
Dept: PRIMARY CARE CLINIC | Age: 53
End: 2023-03-28

## 2023-03-28 VITALS — DIASTOLIC BLOOD PRESSURE: 89 MMHG | SYSTOLIC BLOOD PRESSURE: 123 MMHG

## 2023-03-28 NOTE — TELEPHONE ENCOUNTER
Patient left message with these readings that he had recorded.        (Before meds/8:30) After    3/25  153/97  123/89    3/26  159/100 129/91    3/27  145/96  (None;at work now)

## 2023-05-25 RX ORDER — ASPIRIN 81 MG/1
81 TABLET, CHEWABLE ORAL DAILY
COMMUNITY

## 2023-05-25 RX ORDER — ATORVASTATIN CALCIUM 10 MG/1
10 TABLET, FILM COATED ORAL DAILY
COMMUNITY
Start: 2023-03-25

## 2023-05-25 RX ORDER — NIFEDIPINE 10 MG/1
10 CAPSULE ORAL 2 TIMES DAILY
COMMUNITY
Start: 2022-12-15

## 2023-05-25 RX ORDER — CETIRIZINE HYDROCHLORIDE 10 MG/1
10 TABLET ORAL DAILY
COMMUNITY
Start: 2023-03-10

## 2023-05-25 RX ORDER — LISINOPRIL AND HYDROCHLOROTHIAZIDE 20; 12.5 MG/1; MG/1
1 TABLET ORAL DAILY
COMMUNITY
Start: 2022-09-16

## 2023-05-25 RX ORDER — METOPROLOL SUCCINATE 50 MG/1
50 TABLET, EXTENDED RELEASE ORAL DAILY
COMMUNITY
Start: 2023-03-10

## 2023-09-20 DIAGNOSIS — Z86.010 PERSONAL HISTORY OF COLONIC POLYPS: Primary | ICD-10-CM

## 2023-09-20 PROBLEM — Z86.0100 PERSONAL HISTORY OF COLONIC POLYPS: Status: ACTIVE | Noted: 2023-09-20

## 2023-11-21 ENCOUNTER — OFFICE VISIT (OUTPATIENT)
Facility: CLINIC | Age: 53
End: 2023-11-21
Payer: MEDICAID

## 2023-11-21 VITALS
WEIGHT: 315 LBS | HEART RATE: 78 BPM | SYSTOLIC BLOOD PRESSURE: 159 MMHG | RESPIRATION RATE: 20 BRPM | TEMPERATURE: 97.1 F | DIASTOLIC BLOOD PRESSURE: 110 MMHG | OXYGEN SATURATION: 99 % | HEIGHT: 73 IN | BODY MASS INDEX: 41.75 KG/M2

## 2023-11-21 DIAGNOSIS — I51.7 CARDIOMEGALY: ICD-10-CM

## 2023-11-21 DIAGNOSIS — Z23 IMMUNIZATION DUE: ICD-10-CM

## 2023-11-21 DIAGNOSIS — K42.9 UMBILICAL HERNIA WITHOUT OBSTRUCTION OR GANGRENE: ICD-10-CM

## 2023-11-21 DIAGNOSIS — E78.3 MIXED HYPERGLYCERIDEMIA: ICD-10-CM

## 2023-11-21 DIAGNOSIS — F17.210 CIGARETTE NICOTINE DEPENDENCE WITHOUT COMPLICATION: ICD-10-CM

## 2023-11-21 DIAGNOSIS — E66.01 MORBID (SEVERE) OBESITY DUE TO EXCESS CALORIES (HCC): ICD-10-CM

## 2023-11-21 DIAGNOSIS — M51.36 OTHER INTERVERTEBRAL DISC DEGENERATION, LUMBAR REGION: ICD-10-CM

## 2023-11-21 DIAGNOSIS — I10 ESSENTIAL (PRIMARY) HYPERTENSION: Primary | ICD-10-CM

## 2023-11-21 DIAGNOSIS — Z12.11 SCREENING FOR MALIGNANT NEOPLASM OF COLON: ICD-10-CM

## 2023-11-21 PROCEDURE — 90674 CCIIV4 VAC NO PRSV 0.5 ML IM: CPT | Performed by: NURSE PRACTITIONER

## 2023-11-21 PROCEDURE — 3077F SYST BP >= 140 MM HG: CPT | Performed by: NURSE PRACTITIONER

## 2023-11-21 PROCEDURE — 3080F DIAST BP >= 90 MM HG: CPT | Performed by: NURSE PRACTITIONER

## 2023-11-21 PROCEDURE — 90471 IMMUNIZATION ADMIN: CPT | Performed by: NURSE PRACTITIONER

## 2023-11-21 PROCEDURE — 99214 OFFICE O/P EST MOD 30 MIN: CPT | Performed by: NURSE PRACTITIONER

## 2023-11-21 RX ORDER — ATORVASTATIN CALCIUM 10 MG/1
10 TABLET, FILM COATED ORAL DAILY
Qty: 90 TABLET | Refills: 1 | Status: SHIPPED | OUTPATIENT
Start: 2023-11-21

## 2023-11-21 RX ORDER — METOPROLOL SUCCINATE 50 MG/1
50 TABLET, EXTENDED RELEASE ORAL DAILY
Qty: 90 TABLET | Refills: 1 | Status: SHIPPED | OUTPATIENT
Start: 2023-11-21

## 2023-11-21 RX ORDER — ASPIRIN 81 MG/1
81 TABLET, CHEWABLE ORAL DAILY
Qty: 90 TABLET | Refills: 1 | Status: SHIPPED | OUTPATIENT
Start: 2023-11-21

## 2023-11-21 RX ORDER — NIFEDIPINE 10 MG/1
10 CAPSULE ORAL 2 TIMES DAILY
Qty: 180 CAPSULE | Refills: 1 | Status: SHIPPED | OUTPATIENT
Start: 2023-11-21

## 2023-11-21 NOTE — PROGRESS NOTES
Chief Complaint   Patient presents with    Hypertension     Follow up    Pt has been out of all his meds since July     Pt also wants you to look at his hernia on his stomach     1. Have you been to the ER, urgent care clinic since your last visit? Hospitalized since your last visit? No    2. Have you seen or consulted any other health care providers outside of the [address] [place] [address] since your last visit? Include any pap smears or colon screening.  No
hernia (tender umbilical) is present. Musculoskeletal:         General: Normal range of motion. Right lower leg: No edema. Left lower leg: No edema. Lymphadenopathy:      Cervical: No cervical adenopathy. Skin:     General: Skin is warm and dry. Neurological:      Mental Status: He is alert and oriented to person, place, and time. Mental status is at baseline. Psychiatric:         Mood and Affect: Mood normal.         Behavior: Behavior normal.          1. Essential hypertension  Blood pressure is not at goal today  Resuming on nifedipine 10mg twice daily IR and metoprolol 50mg XR daily  Encourage to monitor at home and scheduling nurse visit in 1 week to repeat blood pressure  - NIFEdipine (PROCARDIA) 10 MG capsule; Take 1 capsule by mouth 2 times daily  Dispense: 180 capsule; Refill: 1  - metoprolol succinate (TOPROL XL) 50 MG extended release tablet; Take 1 tablet by mouth daily  Dispense: 90 tablet; Refill: 1  - aspirin 81 MG chewable tablet; Take 1 tablet by mouth daily  Dispense: 90 tablet; Refill: 1    2. Tobacco dependence  Have counseled on dangers of continued smoking and cessation is encouraged. 3. Obesity, morbid ([address])  Weight increase by 1lb in past 3 mo  Continue to encourage weight loss with decreasing excess fat, salt and sugar in diet along with getting regular exercise 3-5 times weekly for 30-45 minutes consistently. 4. DDD(Degenerative disc disease), lumbar  Stable   Continue home exercises and tylenol prn  Encourage proper lifting techniques  If develops worsening symptoms, encouraged follow up    5. Moderate concentric left ventricular hypertrophy  Continue working on risk control with managing blood pressure and weight  He is following with [facility]     6.  Umbilical hernia without obstruction and without gangrene  Remains reducible but has had tenderness over afffected area  Wants to consider eval with surgeon and will check CT abdomen to evaluate

## 2023-12-01 ENCOUNTER — NURSE ONLY (OUTPATIENT)
Facility: CLINIC | Age: 53
End: 2023-12-01

## 2023-12-01 VITALS — SYSTOLIC BLOOD PRESSURE: 149 MMHG | DIASTOLIC BLOOD PRESSURE: 87 MMHG | HEART RATE: 77 BPM

## 2025-01-15 ENCOUNTER — OFFICE VISIT (OUTPATIENT)
Facility: CLINIC | Age: 55
End: 2025-01-15
Payer: MEDICAID

## 2025-01-15 VITALS
HEART RATE: 98 BPM | OXYGEN SATURATION: 96 % | HEIGHT: 73 IN | SYSTOLIC BLOOD PRESSURE: 185 MMHG | BODY MASS INDEX: 41.75 KG/M2 | TEMPERATURE: 97.2 F | DIASTOLIC BLOOD PRESSURE: 102 MMHG | WEIGHT: 315 LBS | RESPIRATION RATE: 20 BRPM

## 2025-01-15 DIAGNOSIS — E78.3 MIXED HYPERGLYCERIDEMIA: ICD-10-CM

## 2025-01-15 DIAGNOSIS — I10 ESSENTIAL (PRIMARY) HYPERTENSION: ICD-10-CM

## 2025-01-15 DIAGNOSIS — K04.7 DENTAL INFECTION: Primary | ICD-10-CM

## 2025-01-15 PROCEDURE — 3080F DIAST BP >= 90 MM HG: CPT

## 2025-01-15 PROCEDURE — 3077F SYST BP >= 140 MM HG: CPT

## 2025-01-15 PROCEDURE — 99214 OFFICE O/P EST MOD 30 MIN: CPT

## 2025-01-15 RX ORDER — METOPROLOL SUCCINATE 50 MG/1
50 TABLET, EXTENDED RELEASE ORAL DAILY
Qty: 90 TABLET | Refills: 0 | Status: SHIPPED | OUTPATIENT
Start: 2025-01-15

## 2025-01-15 RX ORDER — LEVOFLOXACIN 750 MG/1
750 TABLET, FILM COATED ORAL DAILY
Qty: 10 TABLET | Refills: 0 | Status: SHIPPED | OUTPATIENT
Start: 2025-01-15 | End: 2025-01-25

## 2025-01-15 RX ORDER — ATORVASTATIN CALCIUM 10 MG/1
10 TABLET, FILM COATED ORAL DAILY
Qty: 90 TABLET | Refills: 0 | Status: SHIPPED | OUTPATIENT
Start: 2025-01-15

## 2025-01-15 RX ORDER — NIFEDIPINE 10 MG/1
10 CAPSULE ORAL 2 TIMES DAILY
Qty: 180 CAPSULE | Refills: 0 | Status: SHIPPED | OUTPATIENT
Start: 2025-01-15

## 2025-01-15 RX ORDER — METRONIDAZOLE 500 MG/1
500 TABLET ORAL 3 TIMES DAILY
Qty: 21 TABLET | Refills: 0 | Status: SHIPPED | OUTPATIENT
Start: 2025-01-15 | End: 2025-01-22

## 2025-01-15 RX ORDER — IBUPROFEN 600 MG/1
600 TABLET, FILM COATED ORAL 3 TIMES DAILY PRN
Qty: 30 TABLET | Refills: 0 | Status: SHIPPED | OUTPATIENT
Start: 2025-01-15

## 2025-01-15 SDOH — ECONOMIC STABILITY: FOOD INSECURITY: WITHIN THE PAST 12 MONTHS, THE FOOD YOU BOUGHT JUST DIDN'T LAST AND YOU DIDN'T HAVE MONEY TO GET MORE.: NEVER TRUE

## 2025-01-15 SDOH — ECONOMIC STABILITY: TRANSPORTATION INSECURITY
IN THE PAST 12 MONTHS, HAS THE LACK OF TRANSPORTATION KEPT YOU FROM MEDICAL APPOINTMENTS OR FROM GETTING MEDICATIONS?: NO

## 2025-01-15 SDOH — ECONOMIC STABILITY: TRANSPORTATION INSECURITY
IN THE PAST 12 MONTHS, HAS LACK OF TRANSPORTATION KEPT YOU FROM MEETINGS, WORK, OR FROM GETTING THINGS NEEDED FOR DAILY LIVING?: NO

## 2025-01-15 SDOH — ECONOMIC STABILITY: INCOME INSECURITY: IN THE LAST 12 MONTHS, WAS THERE A TIME WHEN YOU WERE NOT ABLE TO PAY THE MORTGAGE OR RENT ON TIME?: NO

## 2025-01-15 SDOH — ECONOMIC STABILITY: FOOD INSECURITY: WITHIN THE PAST 12 MONTHS, YOU WORRIED THAT YOUR FOOD WOULD RUN OUT BEFORE YOU GOT MONEY TO BUY MORE.: NEVER TRUE

## 2025-01-15 ASSESSMENT — ENCOUNTER SYMPTOMS
RESPIRATORY NEGATIVE: 1
GASTROINTESTINAL NEGATIVE: 1

## 2025-01-15 ASSESSMENT — PATIENT HEALTH QUESTIONNAIRE - PHQ9
2. FEELING DOWN, DEPRESSED OR HOPELESS: NOT AT ALL
1. LITTLE INTEREST OR PLEASURE IN DOING THINGS: NOT AT ALL
SUM OF ALL RESPONSES TO PHQ QUESTIONS 1-9: 0
SUM OF ALL RESPONSES TO PHQ9 QUESTIONS 1 & 2: 0
SUM OF ALL RESPONSES TO PHQ QUESTIONS 1-9: 0

## 2025-01-15 NOTE — PROGRESS NOTES
Davey Luis Sr. is a 54 y.o. 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    Hypercholesterolemia     Hypertension     Personal history of colonic polyps     Sleep apnea         Past Surgical History:   Procedure Laterality Date    COLONOSCOPY N/A 2/26/2021    COLONOSCOPY performed by Mil Hooks MD at [facility] ENDOSCOPY    COLONOSCOPY  01/2021        Family History   Problem Relation Age of Onset    Diabetes Mother     Hypertension Mother         Social History     Tobacco Use    Smoking status: Every Day     Current packs/day: 0.25     Types: Cigarettes    Smokeless tobacco: Never   Vaping Use    Vaping status: Never Used   Substance Use Topics    Alcohol use: No    Drug use: No        HPI  Patient here for multiple complaints today.  Follows with a [facility].  Apparently has been out of medications for quite some time.    Hypertension -patient is been having headaches for the last few weeks.  States he has been out of a blood pressure medication.  Denies chest pain shortness of breath dizziness palpitation or syncope.  Denies gait changes vision changes or extremity weakness.    Dental pain -left upper dental pain, broken tooth now for quite some time.  Patient states he has gum disease and has had multiple broken teeth over the last year.  No dentist.  Requesting referral.  Has not slept in 2 days due to pain.  Onset 2 to 3 days ago.  Denies significant facial swelling or shortness of breath.  Denies throat swelling or pain.    Chief Complaint   Patient presents with    Dental Pain       Current Outpatient Medications on File Prior to Visit   Medication Sig Dispense Refill    aspirin 81 MG chewable tablet Take 1 tablet by mouth daily 90 tablet 1     No

## 2025-01-15 NOTE — PROGRESS NOTES
Chief Complaint   Patient presents with    Dental Pain     \"Have you been to the ER, urgent care clinic since your last visit?  Hospitalized since your last visit?\"    NO    “Have you seen or consulted any other health care providers outside our system since your last visit?”    NO      “Have you had a colorectal cancer screening such as a colonoscopy/FIT/Cologuard?    NO  BP (!) 185/102 (Site: Left Upper Arm, Position: Sitting, Cuff Size: Large Adult)   Pulse 98   Temp 97.2 °F (36.2 °C) (Temporal)   Resp 20   Ht 1.854 m (6' 1\")   Wt (!) 160.2 kg (353 lb 4 oz)   SpO2 96%   BMI 46.61 kg/m²      Date of last Colonoscopy: 2/26/2021  No cologuard on file  No FIT/FOBT on file   No flexible sigmoidoscopy on file

## 2025-01-23 ENCOUNTER — NURSE ONLY (OUTPATIENT)
Facility: CLINIC | Age: 55
End: 2025-01-23
Payer: MEDICAID

## 2025-01-23 VITALS — DIASTOLIC BLOOD PRESSURE: 85 MMHG | SYSTOLIC BLOOD PRESSURE: 161 MMHG

## 2025-01-23 DIAGNOSIS — I10 PRIMARY HYPERTENSION: Primary | ICD-10-CM

## 2025-01-23 PROCEDURE — 99211 OFF/OP EST MAY X REQ PHY/QHP: CPT | Performed by: NURSE PRACTITIONER

## 2025-02-06 ENCOUNTER — OFFICE VISIT (OUTPATIENT)
Facility: CLINIC | Age: 55
End: 2025-02-06
Payer: MEDICAID

## 2025-02-06 VITALS
WEIGHT: 315 LBS | TEMPERATURE: 98.1 F | SYSTOLIC BLOOD PRESSURE: 139 MMHG | HEIGHT: 73 IN | OXYGEN SATURATION: 98 % | RESPIRATION RATE: 20 BRPM | HEART RATE: 78 BPM | BODY MASS INDEX: 41.75 KG/M2 | DIASTOLIC BLOOD PRESSURE: 80 MMHG

## 2025-02-06 DIAGNOSIS — E66.01 MORBID (SEVERE) OBESITY DUE TO EXCESS CALORIES: ICD-10-CM

## 2025-02-06 DIAGNOSIS — Z12.5 ENCOUNTER FOR SCREENING FOR MALIGNANT NEOPLASM OF PROSTATE: ICD-10-CM

## 2025-02-06 DIAGNOSIS — M51.369 DEGENERATION OF INTERVERTEBRAL DISC OF LUMBAR REGION, UNSPECIFIED WHETHER PAIN PRESENT: ICD-10-CM

## 2025-02-06 DIAGNOSIS — Z13.1 SCREENING FOR DIABETES MELLITUS: ICD-10-CM

## 2025-02-06 DIAGNOSIS — I10 ESSENTIAL (PRIMARY) HYPERTENSION: Primary | ICD-10-CM

## 2025-02-06 DIAGNOSIS — Z86.0100 HISTORY OF COLON POLYPS: ICD-10-CM

## 2025-02-06 DIAGNOSIS — E78.3 MIXED HYPERGLYCERIDEMIA: ICD-10-CM

## 2025-02-06 DIAGNOSIS — I51.7 MODERATE CONCENTRIC LEFT VENTRICULAR HYPERTROPHY: ICD-10-CM

## 2025-02-06 DIAGNOSIS — K42.9 UMBILICAL HERNIA WITHOUT OBSTRUCTION AND WITHOUT GANGRENE: ICD-10-CM

## 2025-02-06 DIAGNOSIS — F17.210 CIGARETTE NICOTINE DEPENDENCE WITHOUT COMPLICATION: ICD-10-CM

## 2025-02-06 PROCEDURE — 3075F SYST BP GE 130 - 139MM HG: CPT | Performed by: NURSE PRACTITIONER

## 2025-02-06 PROCEDURE — 99214 OFFICE O/P EST MOD 30 MIN: CPT | Performed by: NURSE PRACTITIONER

## 2025-02-06 PROCEDURE — 3079F DIAST BP 80-89 MM HG: CPT | Performed by: NURSE PRACTITIONER

## 2025-02-06 RX ORDER — METOPROLOL SUCCINATE 50 MG/1
50 TABLET, EXTENDED RELEASE ORAL DAILY
Qty: 90 TABLET | Refills: 2 | Status: SHIPPED | OUTPATIENT
Start: 2025-02-06

## 2025-02-06 RX ORDER — ATORVASTATIN CALCIUM 10 MG/1
10 TABLET, FILM COATED ORAL DAILY
Qty: 90 TABLET | Refills: 2 | Status: SHIPPED | OUTPATIENT
Start: 2025-02-06

## 2025-02-06 RX ORDER — NIFEDIPINE 10 MG/1
10 CAPSULE ORAL 2 TIMES DAILY
Qty: 180 CAPSULE | Refills: 2 | Status: SHIPPED | OUTPATIENT
Start: 2025-02-06

## 2025-02-06 ASSESSMENT — ENCOUNTER SYMPTOMS
APNEA: 0
SORE THROAT: 0
SHORTNESS OF BREATH: 0
COUGH: 0
EYE PAIN: 0
EYE REDNESS: 0
FACIAL SWELLING: 0
CHEST TIGHTNESS: 0
ABDOMINAL PAIN: 0
COLOR CHANGE: 0
STRIDOR: 0
CHOKING: 0
CONSTIPATION: 0
ABDOMINAL DISTENTION: 0
WHEEZING: 0
VOMITING: 0
EYE ITCHING: 0
NAUSEA: 0
BLOOD IN STOOL: 0
TROUBLE SWALLOWING: 0
EYE DISCHARGE: 0
DIARRHEA: 0
SINUS PAIN: 0
PHOTOPHOBIA: 0
BACK PAIN: 0

## 2025-02-06 NOTE — PROGRESS NOTES
Chief Complaint   Patient presents with    Hypertension     Follow up     Pt did not bring meds, went over list, pt confirmed     \"Have you been to the ER, urgent care clinic since your last visit?  Hospitalized since your last visit?\"    NO    “Have you seen or consulted any other health care providers outside our system since your last visit?”    NO      “Have you had a colorectal cancer screening such as a colonoscopy/FIT/Cologuard?    NO    Date of last Colonoscopy: 2/26/2021  No cologuard on file  No FIT/FOBT on file   No flexible sigmoidoscopy on file

## 2025-02-06 NOTE — PROGRESS NOTES
Davey Luis SrTerry is a 52 y.o. 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    Hypercholesterolemia     Hypertension     Personal history of colonic polyps     Sleep apnea         Past Surgical History:   Procedure Laterality Date    COLONOSCOPY N/A 2/26/2021    COLONOSCOPY performed by Mil Hooks MD at [facility] ENDOSCOPY    COLONOSCOPY  01/2021        Family History   Problem Relation Age of Onset    Diabetes Mother     Hypertension Mother         Social History     Socioeconomic History    Marital status:      Spouse name: None    Number of children: None    Years of education: None    Highest education level: None   Tobacco Use    Smoking status: Every Day     Current packs/day: 0.25     Types: Cigarettes    Smokeless tobacco: Never   Vaping Use    Vaping status: Never Used   Substance and Sexual Activity    Alcohol use: No    Drug use: No     Social Determinants of Health     Financial Resource Strain: Patient Declined (3/10/2023)    Overall Financial Resource Strain (CARDIA)     Difficulty of Paying Living Expenses: Patient declined   Food Insecurity: No Food Insecurity (1/15/2025)    Hunger Vital Sign     Worried About Running Out of Food in the Last Year: Never true     Ran Out of Food in the Last Year: Never true   Transportation Needs: No Transportation Needs (1/15/2025)    PRAPARE - Transportation     Lack of Transportation (Medical): No     Lack of Transportation (Non-Medical): No   Housing Stability: Low Risk  (1/15/2025)    Housing Stability Vital Sign     Unable to Pay for Housing in the Last Year: No     Number of Times Moved in the Last Year: 0     Homeless in the Last Year: No        HPI  Patient here today for follow up of chronic

## 2025-02-08 LAB
ALBUMIN SERPL-MCNC: 4.2 G/DL (ref 3.8–4.9)
ALP SERPL-CCNC: 88 IU/L (ref 44–121)
ALT SERPL-CCNC: 17 IU/L (ref 0–44)
AST SERPL-CCNC: 19 IU/L (ref 0–40)
BASOPHILS # BLD AUTO: 0 X10E3/UL (ref 0–0.2)
BASOPHILS NFR BLD AUTO: 0 %
BILIRUB SERPL-MCNC: 0.4 MG/DL (ref 0–1.2)
BUN SERPL-MCNC: 12 MG/DL (ref 6–24)
BUN/CREAT SERPL: 9 (ref 9–20)
CALCIUM SERPL-MCNC: 9.4 MG/DL (ref 8.7–10.2)
CHLORIDE SERPL-SCNC: 105 MMOL/L (ref 96–106)
CHOLEST SERPL-MCNC: 162 MG/DL (ref 100–199)
CO2 SERPL-SCNC: 19 MMOL/L (ref 20–29)
CREAT SERPL-MCNC: 1.31 MG/DL (ref 0.76–1.27)
EGFRCR SERPLBLD CKD-EPI 2021: 65 ML/MIN/1.73
EOSINOPHIL # BLD AUTO: 0.2 X10E3/UL (ref 0–0.4)
EOSINOPHIL NFR BLD AUTO: 3 %
ERYTHROCYTE [DISTWIDTH] IN BLOOD BY AUTOMATED COUNT: 13.3 % (ref 11.6–15.4)
GLOBULIN SER CALC-MCNC: 3 G/DL (ref 1.5–4.5)
GLUCOSE SERPL-MCNC: 90 MG/DL (ref 70–99)
HBA1C MFR BLD: 5.6 % (ref 4.8–5.6)
HCT VFR BLD AUTO: 48.7 % (ref 37.5–51)
HDLC SERPL-MCNC: 35 MG/DL
HGB BLD-MCNC: 15.8 G/DL (ref 13–17.7)
IMM GRANULOCYTES # BLD AUTO: 0 X10E3/UL (ref 0–0.1)
IMM GRANULOCYTES NFR BLD AUTO: 0 %
LDLC SERPL CALC-MCNC: 104 MG/DL (ref 0–99)
LYMPHOCYTES # BLD AUTO: 2.8 X10E3/UL (ref 0.7–3.1)
LYMPHOCYTES NFR BLD AUTO: 44 %
MCH RBC QN AUTO: 29.1 PG (ref 26.6–33)
MCHC RBC AUTO-ENTMCNC: 32.4 G/DL (ref 31.5–35.7)
MCV RBC AUTO: 90 FL (ref 79–97)
MONOCYTES # BLD AUTO: 0.4 X10E3/UL (ref 0.1–0.9)
MONOCYTES NFR BLD AUTO: 6 %
NEUTROPHILS # BLD AUTO: 3 X10E3/UL (ref 1.4–7)
NEUTROPHILS NFR BLD AUTO: 47 %
PLATELET # BLD AUTO: 257 X10E3/UL (ref 150–450)
POTASSIUM SERPL-SCNC: 4.6 MMOL/L (ref 3.5–5.2)
PROT SERPL-MCNC: 7.2 G/DL (ref 6–8.5)
PSA SERPL-MCNC: 0.6 NG/ML (ref 0–4)
RBC # BLD AUTO: 5.43 X10E6/UL (ref 4.14–5.8)
REFLEX CRITERIA: NORMAL
SODIUM SERPL-SCNC: 141 MMOL/L (ref 134–144)
SPECIMEN STATUS REPORT: NORMAL
TRIGL SERPL-MCNC: 125 MG/DL (ref 0–149)
TSH SERPL DL<=0.005 MIU/L-ACNC: 0.82 UIU/ML (ref 0.45–4.5)
VLDLC SERPL CALC-MCNC: 23 MG/DL (ref 5–40)
WBC # BLD AUTO: 6.4 X10E3/UL (ref 3.4–10.8)

## 2025-02-15 LAB
APPEARANCE UR: CLEAR
BACTERIA #/AREA URNS HPF: NORMAL /[HPF]
BILIRUB UR QL STRIP: NEGATIVE
CASTS URNS QL MICRO: NORMAL /LPF
COLOR UR: YELLOW
EPI CELLS #/AREA URNS HPF: NORMAL /HPF (ref 0–10)
GLUCOSE UR QL STRIP: NEGATIVE
HGB UR QL STRIP: NEGATIVE
KETONES UR QL STRIP: NEGATIVE
LEUKOCYTE ESTERASE UR QL STRIP: NEGATIVE
MICRO URNS: NORMAL
MICRO URNS: NORMAL
NITRITE UR QL STRIP: NEGATIVE
PH UR STRIP: 5.5 [PH] (ref 5–7.5)
PROT UR QL STRIP: NEGATIVE
RBC #/AREA URNS HPF: NORMAL /HPF (ref 0–2)
SP GR UR STRIP: 1.02 (ref 1–1.03)
UROBILINOGEN UR STRIP-MCNC: 0.2 MG/DL (ref 0.2–1)
WBC #/AREA URNS HPF: NORMAL /HPF (ref 0–5)

## 2025-02-20 DIAGNOSIS — E78.3 MIXED HYPERGLYCERIDEMIA: ICD-10-CM

## 2025-02-20 DIAGNOSIS — I10 ESSENTIAL (PRIMARY) HYPERTENSION: ICD-10-CM

## 2025-02-20 RX ORDER — ATORVASTATIN CALCIUM 20 MG/1
20 TABLET, FILM COATED ORAL DAILY
Qty: 90 TABLET | Refills: 1 | Status: SHIPPED | OUTPATIENT
Start: 2025-02-20

## 2025-04-12 LAB
ALBUMIN SERPL-MCNC: 4.1 G/DL (ref 3.8–4.9)
ALP SERPL-CCNC: 93 IU/L (ref 44–121)
ALT SERPL-CCNC: 13 IU/L (ref 0–44)
AST SERPL-CCNC: 16 IU/L (ref 0–40)
BILIRUB SERPL-MCNC: 0.6 MG/DL (ref 0–1.2)
BUN SERPL-MCNC: 14 MG/DL (ref 6–24)
BUN/CREAT SERPL: 11 (ref 9–20)
CALCIUM SERPL-MCNC: 9.4 MG/DL (ref 8.7–10.2)
CHLORIDE SERPL-SCNC: 104 MMOL/L (ref 96–106)
CHOLEST SERPL-MCNC: 174 MG/DL (ref 100–199)
CO2 SERPL-SCNC: 20 MMOL/L (ref 20–29)
CREAT SERPL-MCNC: 1.27 MG/DL (ref 0.76–1.27)
EGFRCR SERPLBLD CKD-EPI 2021: 67 ML/MIN/1.73
GLOBULIN SER CALC-MCNC: 3 G/DL (ref 1.5–4.5)
GLUCOSE SERPL-MCNC: 125 MG/DL (ref 70–99)
HDLC SERPL-MCNC: 31 MG/DL
LDLC SERPL CALC-MCNC: 118 MG/DL (ref 0–99)
POTASSIUM SERPL-SCNC: 4.8 MMOL/L (ref 3.5–5.2)
PROT SERPL-MCNC: 7.1 G/DL (ref 6–8.5)
SODIUM SERPL-SCNC: 143 MMOL/L (ref 134–144)
TRIGL SERPL-MCNC: 137 MG/DL (ref 0–149)
VLDLC SERPL CALC-MCNC: 25 MG/DL (ref 5–40)

## 2025-05-05 ENCOUNTER — RESULTS FOLLOW-UP (OUTPATIENT)
Facility: CLINIC | Age: 55
End: 2025-05-05

## (undated) DEVICE — PAD,PREPPING,CUFFED,24X48,7",NONSTERILE: Brand: MEDLINE

## (undated) DEVICE — JELLY,LUBE,STERILE,FLIP TOP,TUBE,4-OZ: Brand: MEDLINE

## (undated) DEVICE — SPONGE GZ W4XL4IN COT 12 PLY TYP VII WVN C FLD DSGN

## (undated) DEVICE — STERILE POLYISOPRENE POWDER-FREE SURGICAL GLOVES: Brand: PROTEXIS

## (undated) DEVICE — WASH CLOTH INCONT LO LINT PREM 12X13 IN LF DISP

## (undated) DEVICE — FCPS RAD JAW 4LC 240CM W/NDL -- BX/20 RADIAL JAW 4

## (undated) DEVICE — SOL IRR STRL H2O 1000ML BTL --

## (undated) DEVICE — 1200CC GUARDIAN II: Brand: GUARDIAN

## (undated) DEVICE — TUBING, SUCTION, 9/32" X 10', STRAIGHT: Brand: MEDLINE